# Patient Record
Sex: FEMALE | Race: WHITE | NOT HISPANIC OR LATINO | Employment: OTHER | URBAN - NONMETROPOLITAN AREA
[De-identification: names, ages, dates, MRNs, and addresses within clinical notes are randomized per-mention and may not be internally consistent; named-entity substitution may affect disease eponyms.]

---

## 2017-04-14 ENCOUNTER — OFFICE VISIT (OUTPATIENT)
Dept: CARDIOLOGY | Facility: CLINIC | Age: 62
End: 2017-04-14

## 2017-04-14 VITALS
HEART RATE: 80 BPM | DIASTOLIC BLOOD PRESSURE: 62 MMHG | SYSTOLIC BLOOD PRESSURE: 114 MMHG | HEIGHT: 67 IN | BODY MASS INDEX: 28.41 KG/M2 | WEIGHT: 181 LBS

## 2017-04-14 DIAGNOSIS — R00.2 PALPITATIONS: ICD-10-CM

## 2017-04-14 DIAGNOSIS — I34.1 MITRAL VALVE PROLAPSE: ICD-10-CM

## 2017-04-14 DIAGNOSIS — R06.02 SOB (SHORTNESS OF BREATH): Primary | ICD-10-CM

## 2017-04-14 PROBLEM — I34.0 NON-RHEUMATIC MITRAL REGURGITATION: Status: ACTIVE | Noted: 2017-04-14

## 2017-04-14 PROBLEM — I34.0 NON-RHEUMATIC MITRAL REGURGITATION: Status: RESOLVED | Noted: 2017-04-14 | Resolved: 2017-04-14

## 2017-04-14 PROCEDURE — 99213 OFFICE O/P EST LOW 20 MIN: CPT | Performed by: INTERNAL MEDICINE

## 2017-04-14 RX ORDER — METOPROLOL SUCCINATE 25 MG/1
25 TABLET, EXTENDED RELEASE ORAL DAILY
Qty: 90 TABLET | Refills: 5 | Status: SHIPPED | OUTPATIENT
Start: 2017-04-14 | End: 2017-11-08 | Stop reason: SDUPTHER

## 2017-04-14 NOTE — PROGRESS NOTES
[facility]  OFFICE NOTE    Lorin Fernandez  62 y.o. female    04/14/2017  1. SOB (shortness of breath)    2. Palpitations    3. Mitral valve prolapse        Chief complaint -palpitations      History of present Illness- 62-year-old lady with history of mitral prolapse and palpitations well controlled with Toprol-XL 25 mg daily, she had vitiligo for a long time.  She denies any chest pain other than occasional palpitations she gets once or twice a week.  She takes Zyrtec for allergies.  Only for the seasonal allergy.  She has been feeling weak on the right side I asked her to see her family doctor or the neurologist.            No Known Allergies      Past Medical History:   Diagnosis Date   • Malignant neoplasm of breast    • Palpitations    • SOB (shortness of breath)          Past Surgical History:   Procedure Laterality Date   • CHOLECYSTECTOMY     • MASTECTOMY     • TONSILLECTOMY AND ADENOIDECTOMY     • TUBAL ABDOMINAL LIGATION           No family history on file.      Social History     Social History   • Marital status:      Spouse name: N/A   • Number of children: N/A   • Years of education: N/A     Occupational History   • Not on file.     Social History Main Topics   • Smoking status: Never Smoker   • Smokeless tobacco: Never Used   • Alcohol use Yes      Comment: social   • Drug use: No   • Sexual activity: Defer     Other Topics Concern   • Not on file     Social History Narrative         Current Outpatient Prescriptions   Medication Sig Dispense Refill   • cetirizine (zyrTEC) 10 MG tablet Take 10 mg by mouth Daily.     • diphenhydrAMINE (BENADRYL) 25 mg capsule Take 25 mg by mouth Every 6 (Six) Hours As Needed for Itching.     • metoprolol succinate XL (TOPROL-XL) 25 MG 24 hr tablet Take 1 tablet by mouth Daily. 90 tablet 5     No current facility-administered medications for this visit.          Review of Systems     Constitution: Denies any fatigue, fever or  "chills    HENT: Denies any headache, hearing impairment,     Eyes: Denies any blurring of vision, or photophobia     Cardivascular - As per history of present illness     Respiratory system-denies any COPD, shortness of breath,   sleep apnea.     Endocrine:  No history of hyperlipidemia, diabetes,                      Hypothyrodism       Musculoskeletal:  No history of arthritis with musculoskeletal problems    Gastrointestinal: No nausea, vomiting, or melena    Genitourinary: No dysuria or hematuria    Neurological:   No history of seizure disorder, stroke, memory problems    Psychiatric/Behavioral:        No history of depression,  No history of bipolar disorder or schizophrenia     Hematological- no history of easy bruising or any bleeding diathesis            OBJECTIVE    /62  Pulse 80  Ht 67\" (170.2 cm)  Wt 181 lb (82.1 kg)  BMI 28.35 kg/m2      Physical Exam     Constitutional: is oriented to person, place, and time.     Skin-warm and dry, has vitiligo on the hands, history of breast cancer    Well developed and nourished in no acute distress      Head: Normocephalic and atraumatic.     Eyes: Pupils are equal, round, and reactive to light.     Neck: Neck supple. No bruit in the carotids, no elevation of JVD    Cardiovascular: [place] in the fifth intercostal space   Regular rate, and  Rhythm, 2/6 holosystolic murmur with a click    S1 greater than S2, no S3 or S4, no gallop     Pulmonary/Chest:   Air  Entry is equal on both sides  No wheezing or crackles,      Abdominal: Soft.  No hepatosplenomegaly, bowel sounds are present    Musculoskeletal: No kyphoscoliosis, no significant thickening of the joints    Neurological: is alert and oriented to person, place, and time.    cranial nerve are intact .   No motor or sensory deficit    Extremities-no edema, no radial femoral delay      Psychiatric: He has a normal mood and affect.                  His behavior is normal.           Procedures      Lab Results "   Component Value Date    WBC 5.4 11/01/2016    HGB 13.1 11/01/2016    HCT 39.0 11/01/2016    MCV 84.2 11/01/2016     11/01/2016     Lab Results   Component Value Date    GLUCOSE 96 11/01/2016    BUN 11 11/01/2016    CREATININE 0.8 11/01/2016    CO2 30 11/01/2016    CALCIUM 8.8 11/01/2016    ALBUMIN 4.5 11/01/2016    AST 28 11/01/2016    ALT 25 11/01/2016     No results found for: CHOL  Lab Results   Component Value Date    TRIG 69 01/15/2016     No results found for: HDL  Lab Results   Component Value Date    LDLCALC 128 01/15/2016     No results found for: LDL  No results found for: HDLLDLRATIO  No components found for: CHOLHDL  No results found for: HGBA1C  Lab Results   Component Value Date    TSH 2.12 07/21/2016                  A/P    Mitral valve prolapse with mild MR with palpitations on Toprol-XL 25 mg daily and palpitations are well controlled.    Vitligo - stable    Seasonal allergies on Zyrtec 10 mg daily.  Follow-up in 1 year            This document has been electronically signed by Gibran Pal MD on April 14, 2017 9:34 AM       EMR Dragon/Transcription disclaimer:   Some of this note may be an electronic transcription/translation of spoken language to printed text. The electronic translation of spoken language may permit erroneous, or at times, nonsensical words or phrases to be inadvertently transcribed; Although I have reviewed the note for such errors, some may still exist.

## 2017-04-17 DIAGNOSIS — R53.1 WEAKNESS: Primary | ICD-10-CM

## 2017-05-09 ENCOUNTER — OFFICE VISIT (OUTPATIENT)
Dept: INTERNAL MEDICINE | Facility: CLINIC | Age: 62
End: 2017-05-09

## 2017-05-09 ENCOUNTER — APPOINTMENT (OUTPATIENT)
Dept: LAB | Facility: HOSPITAL | Age: 62
End: 2017-05-09

## 2017-05-09 VITALS
HEIGHT: 67 IN | BODY MASS INDEX: 28.49 KG/M2 | WEIGHT: 181.5 LBS | SYSTOLIC BLOOD PRESSURE: 116 MMHG | DIASTOLIC BLOOD PRESSURE: 72 MMHG

## 2017-05-09 DIAGNOSIS — R51.9 NONINTRACTABLE HEADACHE, UNSPECIFIED CHRONICITY PATTERN, UNSPECIFIED HEADACHE TYPE: Primary | ICD-10-CM

## 2017-05-09 LAB
BASOPHILS # BLD AUTO: 0.04 10*3/MM3 (ref 0–0.2)
BASOPHILS NFR BLD AUTO: 0.5 % (ref 0–2)
CRP SERPL-MCNC: 0.9 MG/DL (ref 0–1)
DEPRECATED RDW RBC AUTO: 40.5 FL (ref 36.4–46.3)
EOSINOPHIL # BLD AUTO: 0.05 10*3/MM3 (ref 0–0.7)
EOSINOPHIL NFR BLD AUTO: 0.6 % (ref 0–7)
ERYTHROCYTE [DISTWIDTH] IN BLOOD BY AUTOMATED COUNT: 13.2 % (ref 11.5–14.5)
ERYTHROCYTE [SEDIMENTATION RATE] IN BLOOD: 13 MM/HR (ref 0–20)
HCT VFR BLD AUTO: 38.5 % (ref 35–45)
HGB BLD-MCNC: 12.8 G/DL (ref 12–15.5)
IMM GRANULOCYTES # BLD: 0.02 10*3/MM3 (ref 0–0.02)
IMM GRANULOCYTES NFR BLD: 0.2 % (ref 0–0.5)
LYMPHOCYTES # BLD AUTO: 1.89 10*3/MM3 (ref 0.6–4.2)
LYMPHOCYTES NFR BLD AUTO: 21.9 % (ref 10–50)
MCH RBC QN AUTO: 28.1 PG (ref 26.5–34)
MCHC RBC AUTO-ENTMCNC: 33.2 G/DL (ref 31.4–36)
MCV RBC AUTO: 84.6 FL (ref 80–98)
MONOCYTES # BLD AUTO: 0.59 10*3/MM3 (ref 0–0.9)
MONOCYTES NFR BLD AUTO: 6.8 % (ref 0–12)
NEUTROPHILS # BLD AUTO: 6.04 10*3/MM3 (ref 2–8.6)
NEUTROPHILS NFR BLD AUTO: 70 % (ref 37–80)
PLATELET # BLD AUTO: 294 10*3/MM3 (ref 150–450)
PMV BLD AUTO: 9.5 FL (ref 8–12)
RBC # BLD AUTO: 4.55 10*6/MM3 (ref 3.77–5.16)
WBC NRBC COR # BLD: 8.63 10*3/MM3 (ref 3.2–9.8)

## 2017-05-09 PROCEDURE — 85025 COMPLETE CBC W/AUTO DIFF WBC: CPT | Performed by: INTERNAL MEDICINE

## 2017-05-09 PROCEDURE — 99213 OFFICE O/P EST LOW 20 MIN: CPT | Performed by: INTERNAL MEDICINE

## 2017-05-09 PROCEDURE — 86140 C-REACTIVE PROTEIN: CPT | Performed by: INTERNAL MEDICINE

## 2017-05-09 PROCEDURE — 36415 COLL VENOUS BLD VENIPUNCTURE: CPT | Performed by: INTERNAL MEDICINE

## 2017-05-09 PROCEDURE — 85651 RBC SED RATE NONAUTOMATED: CPT | Performed by: INTERNAL MEDICINE

## 2017-11-08 RX ORDER — METOPROLOL SUCCINATE 25 MG/1
25 TABLET, EXTENDED RELEASE ORAL DAILY
Qty: 90 TABLET | Refills: 5 | Status: SHIPPED | OUTPATIENT
Start: 2017-11-08 | End: 2019-01-14 | Stop reason: SDUPTHER

## 2018-03-23 ENCOUNTER — OFFICE VISIT (OUTPATIENT)
Dept: INTERNAL MEDICINE | Facility: CLINIC | Age: 63
End: 2018-03-23

## 2018-03-23 VITALS
HEIGHT: 67 IN | SYSTOLIC BLOOD PRESSURE: 100 MMHG | WEIGHT: 167.7 LBS | BODY MASS INDEX: 26.32 KG/M2 | DIASTOLIC BLOOD PRESSURE: 70 MMHG

## 2018-03-23 DIAGNOSIS — G20 PARKINSON'S DISEASE (HCC): ICD-10-CM

## 2018-03-23 DIAGNOSIS — G43.909 MIGRAINE WITHOUT STATUS MIGRAINOSUS, NOT INTRACTABLE, UNSPECIFIED MIGRAINE TYPE: Primary | ICD-10-CM

## 2018-03-23 PROCEDURE — 99213 OFFICE O/P EST LOW 20 MIN: CPT | Performed by: INTERNAL MEDICINE

## 2018-03-23 RX ORDER — SODIUM PHOSPHATE,MONO-DIBASIC 19G-7G/118
ENEMA (ML) RECTAL
COMMUNITY
End: 2019-10-03

## 2018-03-23 RX ORDER — SUMATRIPTAN 50 MG/1
TABLET, FILM COATED ORAL
Qty: 30 TABLET | Refills: 1 | Status: SHIPPED | OUTPATIENT
Start: 2018-03-23 | End: 2019-10-03

## 2018-03-23 NOTE — PROGRESS NOTES
Subjective   Lorin Fernandez is a 62 y.o. female with PMH of Parkinson's disease.    Patient has been on Sinemet for a year- doing well. Tremors are better and no stiffness. Mild gait problems.  She was seen by  but would like to see different neurologist due to access issues.    Having migraine headaches for the last several weeks. Throbbing unilateral headaches, preceded by visual aura, flashes and accompanied by photophobia. Was seen ophthalmology - was told that no eye problems.  Takes Ibuprofen and had to take it daily.    Migraine    The current episode started more than 1 month ago. The pain is located in the left unilateral region. The pain does not radiate. The quality of the pain is described as throbbing. The pain is at a severity of 4/10. The pain is moderate. Associated symptoms include photophobia and a visual change. Pertinent negatives include no coughing, dizziness, facial sweating, fever, neck pain, numbness, rhinorrhea, scalp tenderness, seizures, vomiting or weakness. The symptoms are aggravated by unknown. She has tried NSAIDs for the symptoms. The treatment provided mild relief.       Past Medical History:   Diagnosis Date   • Malignant neoplasm of breast    • Palpitations    • Parkinson's disease    • SOB (shortness of breath)      Past Surgical History:   Procedure Laterality Date   • CHOLECYSTECTOMY     • COLONOSCOPY      2011 as per patient - normal exam   • MASTECTOMY     • TONSILLECTOMY AND ADENOIDECTOMY     • TUBAL ABDOMINAL LIGATION         Social History   Substance Use Topics   • Smoking status: Never Smoker   • Smokeless tobacco: Never Used   • Alcohol use Yes      Comment: social     History reviewed. No pertinent family history.  No Known Allergies  Current Outpatient Prescriptions on File Prior to Visit   Medication Sig Dispense Refill   • cetirizine (zyrTEC) 10 MG tablet Take 10 mg by mouth Daily.     • diphenhydrAMINE (BENADRYL) 25 mg capsule Take 25 mg by mouth Every  6 (Six) Hours As Needed for Itching.     • metoprolol succinate XL (TOPROL-XL) 25 MG 24 hr tablet Take 1 tablet by mouth Daily. 90 tablet 5     No current facility-administered medications on file prior to visit.      Review of Systems   Constitutional: Negative for fever.   HENT: Negative for congestion, facial swelling, rhinorrhea and sinus pain.    Eyes: Positive for photophobia.   Respiratory: Negative for cough and shortness of breath.    Cardiovascular: Negative for leg swelling.   Gastrointestinal: Negative for vomiting.   Musculoskeletal: Positive for gait problem. Negative for neck pain.   Neurological: Positive for tremors and headaches. Negative for dizziness, seizures, syncope, speech difficulty, weakness, light-headedness and numbness.   Hematological: Negative for adenopathy. Does not bruise/bleed easily.       Objective   Physical Exam   Constitutional: She is oriented to person, place, and time. She appears well-developed and well-nourished.   HENT:   Head: Normocephalic and atraumatic.   Eyes: Conjunctivae are normal. Pupils are equal, round, and reactive to light. No scleral icterus.   Neck: Neck supple. No JVD present. No thyromegaly present.   Cardiovascular: Normal rate and regular rhythm.    Pulmonary/Chest: Effort normal and breath sounds normal.   Abdominal: Soft. Bowel sounds are normal.   Musculoskeletal: She exhibits no edema or deformity.   Neurological: She is alert and oriented to person, place, and time. She has normal reflexes. She displays normal reflexes. No cranial nerve deficit. She exhibits normal muscle tone.   Mild rest tremor   Skin: Skin is warm and dry.           Patient Active Problem List   Diagnosis   • SOB (shortness of breath)   • Palpitations   • Malignant neoplasm of breast   • Mitral valve prolapse               Assessment/Plan   Lorin was seen today for follow-up, parkinson's disease and headache.    Diagnoses and all orders for this visit:    Migraine without  status migrainosus, not intractable, unspecified migraine type    Parkinson's disease  -     Ambulatory Referral to Neurology  -     CBC (No Diff)  -     Comprehensive metabolic panel  -     TSH    Other orders  -     SUMAtriptan (IMITREX) 50 MG tablet; Take one tablet at onset of headache. May repeat dose one time in 2 hours if headache not relieved.           Plan     1. Trial of Imitrex 50 mg PRN headache and may advance dose if needed to 100 mg.      Medication side effects reviewed.      Advised about judicious use of OTC NSAIDS and analgesics as daily use can cause medication             overuse headaches.       Watch for headaches triggers.      May need prophylaxis if continues to have frequent migraines.  2. Doing well on Sinemet but would like to see neurologist and this will be arranged.  3. Not interested in vaccinations.        Follow up in a month.        This document has been electronically signed by Lise Lopez MD on March 23, 2018 10:53 PM

## 2019-01-15 RX ORDER — METOPROLOL SUCCINATE 25 MG/1
TABLET, EXTENDED RELEASE ORAL
Qty: 90 TABLET | Refills: 3 | Status: SHIPPED | OUTPATIENT
Start: 2019-01-15 | End: 2019-11-11

## 2019-10-03 ENCOUNTER — CONSULT (OUTPATIENT)
Dept: ONCOLOGY | Facility: CLINIC | Age: 64
End: 2019-10-03

## 2019-10-03 ENCOUNTER — APPOINTMENT (OUTPATIENT)
Dept: ONCOLOGY | Facility: HOSPITAL | Age: 64
End: 2019-10-03

## 2019-10-03 VITALS
SYSTOLIC BLOOD PRESSURE: 125 MMHG | DIASTOLIC BLOOD PRESSURE: 63 MMHG | BODY MASS INDEX: 23.17 KG/M2 | TEMPERATURE: 98.5 F | HEIGHT: 67 IN | HEART RATE: 74 BPM | WEIGHT: 147.6 LBS

## 2019-10-03 DIAGNOSIS — C50.811 MALIGNANT NEOPLASM OF OVERLAPPING SITES OF BOTH BREASTS IN FEMALE, ESTROGEN RECEPTOR POSITIVE (HCC): ICD-10-CM

## 2019-10-03 DIAGNOSIS — C50.812 MALIGNANT NEOPLASM OF OVERLAPPING SITES OF BOTH BREASTS IN FEMALE, ESTROGEN RECEPTOR POSITIVE (HCC): Primary | ICD-10-CM

## 2019-10-03 DIAGNOSIS — Z17.0 MALIGNANT NEOPLASM OF OVERLAPPING SITES OF BOTH BREASTS IN FEMALE, ESTROGEN RECEPTOR POSITIVE (HCC): Primary | ICD-10-CM

## 2019-10-03 DIAGNOSIS — C50.812 MALIGNANT NEOPLASM OF OVERLAPPING SITES OF BOTH BREASTS IN FEMALE, ESTROGEN RECEPTOR POSITIVE (HCC): ICD-10-CM

## 2019-10-03 DIAGNOSIS — C50.811 MALIGNANT NEOPLASM OF OVERLAPPING SITES OF BOTH BREASTS IN FEMALE, ESTROGEN RECEPTOR POSITIVE (HCC): Primary | ICD-10-CM

## 2019-10-03 DIAGNOSIS — R63.4 ABNORMAL WEIGHT LOSS: ICD-10-CM

## 2019-10-03 DIAGNOSIS — K59.09 OTHER CONSTIPATION: ICD-10-CM

## 2019-10-03 DIAGNOSIS — Z17.0 MALIGNANT NEOPLASM OF OVERLAPPING SITES OF BOTH BREASTS IN FEMALE, ESTROGEN RECEPTOR POSITIVE (HCC): ICD-10-CM

## 2019-10-03 LAB
ALBUMIN SERPL-MCNC: 4.7 G/DL (ref 3.5–5.2)
ALBUMIN/GLOB SERPL: 1.7 G/DL
ALP SERPL-CCNC: 65 U/L (ref 39–117)
ALT SERPL W P-5'-P-CCNC: <5 U/L (ref 1–33)
ANION GAP SERPL CALCULATED.3IONS-SCNC: 10 MMOL/L (ref 5–15)
AST SERPL-CCNC: 9 U/L (ref 1–32)
BASOPHILS # BLD AUTO: 0.07 10*3/MM3 (ref 0–0.2)
BASOPHILS NFR BLD AUTO: 1.1 % (ref 0–1.5)
BILIRUB SERPL-MCNC: 0.2 MG/DL (ref 0.2–1.2)
BUN BLD-MCNC: 12 MG/DL (ref 8–23)
BUN/CREAT SERPL: 15.4 (ref 7–25)
CALCIUM SPEC-SCNC: 9.3 MG/DL (ref 8.6–10.5)
CHLORIDE SERPL-SCNC: 101 MMOL/L (ref 98–107)
CO2 SERPL-SCNC: 28 MMOL/L (ref 22–29)
CREAT BLD-MCNC: 0.78 MG/DL (ref 0.57–1)
DEPRECATED RDW RBC AUTO: 41.3 FL (ref 37–54)
EOSINOPHIL # BLD AUTO: 0.06 10*3/MM3 (ref 0–0.4)
EOSINOPHIL NFR BLD AUTO: 0.9 % (ref 0.3–6.2)
ERYTHROCYTE [DISTWIDTH] IN BLOOD BY AUTOMATED COUNT: 13.1 % (ref 12.3–15.4)
GFR SERPL CREATININE-BSD FRML MDRD: 74 ML/MIN/1.73
GLOBULIN UR ELPH-MCNC: 2.7 GM/DL
GLUCOSE BLD-MCNC: 103 MG/DL (ref 65–99)
HCT VFR BLD AUTO: 38.1 % (ref 34–46.6)
HGB BLD-MCNC: 12.9 G/DL (ref 12–15.9)
IMM GRANULOCYTES # BLD AUTO: 0.02 10*3/MM3 (ref 0–0.05)
IMM GRANULOCYTES NFR BLD AUTO: 0.3 % (ref 0–0.5)
LYMPHOCYTES # BLD AUTO: 1.88 10*3/MM3 (ref 0.7–3.1)
LYMPHOCYTES NFR BLD AUTO: 28.5 % (ref 19.6–45.3)
MCH RBC QN AUTO: 29.6 PG (ref 26.6–33)
MCHC RBC AUTO-ENTMCNC: 33.9 G/DL (ref 31.5–35.7)
MCV RBC AUTO: 87.4 FL (ref 79–97)
MONOCYTES # BLD AUTO: 0.65 10*3/MM3 (ref 0.1–0.9)
MONOCYTES NFR BLD AUTO: 9.9 % (ref 5–12)
NEUTROPHILS # BLD AUTO: 3.91 10*3/MM3 (ref 1.7–7)
NEUTROPHILS NFR BLD AUTO: 59.3 % (ref 42.7–76)
NRBC BLD AUTO-RTO: 0 /100 WBC (ref 0–0.2)
PLATELET # BLD AUTO: 294 10*3/MM3 (ref 140–450)
PMV BLD AUTO: 9.1 FL (ref 6–12)
POTASSIUM BLD-SCNC: 4.1 MMOL/L (ref 3.5–5.2)
PROT SERPL-MCNC: 7.4 G/DL (ref 6–8.5)
RBC # BLD AUTO: 4.36 10*6/MM3 (ref 3.77–5.28)
SODIUM BLD-SCNC: 139 MMOL/L (ref 136–145)
WBC NRBC COR # BLD: 6.59 10*3/MM3 (ref 3.4–10.8)

## 2019-10-03 PROCEDURE — G9903 PT SCRN TBCO ID AS NON USER: HCPCS | Performed by: INTERNAL MEDICINE

## 2019-10-03 PROCEDURE — 99214 OFFICE O/P EST MOD 30 MIN: CPT | Performed by: INTERNAL MEDICINE

## 2019-10-03 PROCEDURE — 1123F ACP DISCUSS/DSCN MKR DOCD: CPT | Performed by: INTERNAL MEDICINE

## 2019-10-03 PROCEDURE — 80053 COMPREHEN METABOLIC PANEL: CPT | Performed by: INTERNAL MEDICINE

## 2019-10-03 PROCEDURE — G0463 HOSPITAL OUTPT CLINIC VISIT: HCPCS | Performed by: INTERNAL MEDICINE

## 2019-10-03 PROCEDURE — G8731 PAIN NEG NO PLAN: HCPCS | Performed by: INTERNAL MEDICINE

## 2019-10-03 PROCEDURE — 85025 COMPLETE CBC W/AUTO DIFF WBC: CPT | Performed by: INTERNAL MEDICINE

## 2019-10-03 RX ORDER — ENTACAPONE 200 MG/1
200 TABLET ORAL 3 TIMES DAILY
COMMUNITY

## 2019-10-03 NOTE — PROGRESS NOTES
DATE OF VISIT: 10/3/2019      REASON FOR VISIT: Synchronus bilateral breast cancer, abnormal weight loss, new onset of constipation      HISTORY OF PRESENT ILLNESS:    64-year-old female with medical problem consisting of synchronous bilateral breast cancer diagnosed in 2010 status post chemotherapy and could not tolerate hormonal therapy so she has been on surveillance since 2012,  mitral valve prolapse,  Parkinson's disease on Sinemet was last seen here at [facility] in November 2016.  That after that patient was lost to follow-up.  Patient returns to [facility] today with complaint of abnormal weight loss of 30 pounds since last clinic visit.  States she has lost 8 pounds in last 2 weeks.  Denies any nausea or vomiting.  Complains of new onset of constipation since starting Sinemet.  Denies any blood in the stool.  Denies any new lymph node enlargement.  Denies any new skin lesion.  Denies any new tingling or numbness affecting upper or lower extremity.      PAST MEDICAL HISTORY:    Past Medical History:   Diagnosis Date   • Malignant neoplasm of breast (CMS/HCC)    • Palpitations    • Parkinson's disease (CMS/HCC)    • SOB (shortness of breath)        SOCIAL HISTORY:    Social History     Tobacco Use   • Smoking status: Never Smoker   • Smokeless tobacco: Never Used   Substance Use Topics   • Alcohol use: Yes     Comment: social   • Drug use: No       Surgical History :  Past Surgical History:   Procedure Laterality Date   • ANAL FISSURECTOMY     • CHOLECYSTECTOMY     • COLONOSCOPY      2011 as per patient - normal exam   • MASTECTOMY     • TONSILLECTOMY AND ADENOIDECTOMY     • TUBAL ABDOMINAL LIGATION         ALLERGIES:    No Known Allergies      FAMILY HISTORY:  Family History   Problem Relation Age of Onset   • Breast cancer Mother    • Lung cancer Father    • Breast cancer Maternal Aunt    • Breast cancer Maternal Grandmother    • Breast cancer Paternal Grandmother            REVIEW OF  "SYSTEMS:      CONSTITUTIONAL:  Complains of fatigue.  Complains of 30 pound of weight loss in last 3-year.  States she has lost 8 pounds in last 2 weeks.  Denies any fever, chills .     EYES: No visual disturbances. No discharge. No new lesions    ENMT:  No epistaxis, mouth sores or difficulty swallowing.    RESPIRATORY:  No new shortness of breath. No new cough or hemoptysis.    CARDIOVASCULAR: Complains of intermittent palpitation.  No chest pain .    GASTROINTESTINAL: Complains of new onset of constipation since starting Sinemet.Complains of intermittent nausea.  No abdominal pain nausea, vomiting.    GENITOURINARY: No Dysuria or Hematuria.    MUSCULOSKELETAL:  No new back pain or arthralgia.    LYMPHATICS:  Denies any abnormal swollen glands anywhere in the body.    NEUROLOGICAL : No tingling or numbness. No headache or dizziness. No seizures or balance problems.    SKIN: No new skin lesions.    ENDOCRINE : No new heat or cold intolerance. No new polyuria . No polydipsia.        PHYSICAL EXAMINATION:      VITAL SIGNS:  /63   Pulse 74   Temp 98.5 °F (36.9 °C)   Ht 170.2 cm (67\")   Wt 67 kg (147 lb 9.6 oz)   BMI 23.12 kg/m²       10/03/19  1354   Weight: 67 kg (147 lb 9.6 oz)         ECOG performance status: 2    CONSTITUTIONAL:  Not in any distress.    EYES: Mild conjunctival Pallor. No Icterus. No Pterygium. Extraocular Movements intact.No ptosis.    ENMT:  Normocephalic, Atraumatic.No Facial Asymmetry noted.    NECK:  No adenopathy.Trachea midline. NO JVD.    RESPIRATORY:  Fair air entry bilateral. No rhonchi or wheezing.Fair respiratory effort.    CARDIOVASCULAR:  S1, S2. Regular rate and rhythm. No murmur or gallop appreciated.    ABDOMEN:  Soft, nontender. Bowel sounds present in all four quadrants.  No Hepatosplenomegaly appreciated.    MUSCULOSKELETAL:  No edema.No Calf Tenderness.Decreased range of motion.    NEUROLOGIC:    No  Motor or sensory deficit appreciated. Cranial Nerves 2-12 grossly " intact.    SKIN : No new skin lesion identified. Skin is warm and dry to touch.    LYMPHATICS: No new enlarged lymph nodes in neck or supraclavicular area.    PSYCHIATRY: Alert, awake and oriented ×3.Normal affect.   Makes good eye contact.        DIAGNOSTIC DATA:    Glucose   Date Value Ref Range Status   10/03/2019 103 (H) 65 - 99 mg/dL Final     Sodium   Date Value Ref Range Status   10/03/2019 139 136 - 145 mmol/L Final     Potassium   Date Value Ref Range Status   10/03/2019 4.1 3.5 - 5.2 mmol/L Final     CO2   Date Value Ref Range Status   10/03/2019 28.0 22.0 - 29.0 mmol/L Final     Chloride   Date Value Ref Range Status   10/03/2019 101 98 - 107 mmol/L Final     Anion Gap   Date Value Ref Range Status   10/03/2019 10.0 5.0 - 15.0 mmol/L Final     Creatinine   Date Value Ref Range Status   10/03/2019 0.78 0.57 - 1.00 mg/dL Final     BUN   Date Value Ref Range Status   10/03/2019 12 8 - 23 mg/dL Final     BUN/Creatinine Ratio   Date Value Ref Range Status   10/03/2019 15.4 7.0 - 25.0 Final     Calcium   Date Value Ref Range Status   10/03/2019 9.3 8.6 - 10.5 mg/dL Final     eGFR Non  Amer   Date Value Ref Range Status   10/03/2019 74 >60 mL/min/1.73 Final     Alkaline Phosphatase   Date Value Ref Range Status   10/03/2019 65 39 - 117 U/L Final     Total Protein   Date Value Ref Range Status   10/03/2019 7.4 6.0 - 8.5 g/dL Final     ALT (SGPT)   Date Value Ref Range Status   10/03/2019 <5 1 - 33 U/L Final     AST (SGOT)   Date Value Ref Range Status   10/03/2019 9 1 - 32 U/L Final     Total Bilirubin   Date Value Ref Range Status   10/03/2019 0.2 0.2 - 1.2 mg/dL Final     Albumin   Date Value Ref Range Status   10/03/2019 4.70 3.50 - 5.20 g/dL Final     Globulin   Date Value Ref Range Status   10/03/2019 2.7 gm/dL Final     Lab Results   Component Value Date    WBC 6.59 10/03/2019    HGB 12.9 10/03/2019    HCT 38.1 10/03/2019    MCV 87.4 10/03/2019     10/03/2019     Lab Results   Component Value  Date    NEUTROABS 3.91 10/03/2019     No results found for: , LABCA2, AFPTM, HCGQUANT, , CHROMGRNA, [id number], CEA, REFLABREPO        PATHOLOGY:  Pathology  report from September 24, 2010 showed:                            ASSESSMENT AND PLAN:      1.  Synchronous bilateral breast cancer:  - Patient  was diagnosed with bilateral synchronous breast cancer in August 2010.  -Patient had a bilateral mastectomy done a friend.  She had a stage I cancer on left side.  She had stage IIa cancer on right side with 2 lymph node having microscopic metastasis.  She was positive for estrogen and progesterone receptor negative for HER-2/ranjith.  -Patient initially received chemotherapy and dose dense fashion with Adriamycin and Cytoxan followed by Taxol that completed in February 2011.  - Patient was tried on 3 different hormone kind of medication but she could not tolerate it.  She has been off any kind of hormonal treatment since 2012.  - Since patient has a history of hormone receptor positive cancer and she could not tolerate any hormonal treatment, weight loss of 30 pound is worrisome for recurrence.  -We will get CT of chest, abdomen and pelvis with contrast to rule out any recurrence of her breast cancer.  This recommendation were discussed with patient.  -We will ask patient to return to clinic in 2 weeks to go over the results of CT scan and further recommendation.    2.  Weight loss: Questionable etiology:  - Her history of bilateral breast cancer and not being able to complete treatment with hormonal treatment is worrisome factor for recurrence.  - We will get CT of chest, abdomen and pelvis with contrast to rule out recurrence.    3.  New onset of constipation:  -Patient complains of new onset of constipation since starting taking Sinemet.  -We will check stool for occult blood, if stool for occult blood comes back positive will refer her to gastroenterology team for further evaluation.    4.  Parkinson's  disease on Sinemet    5.  Health maintenance: Patient quit smoking in 1981.  She had a bilateral mastectomy does not need mammogram.  She had hysterectomy in does not get Pap smear.    6.  Pain assessment:  -Patient denies any pain today.    7.  Advanced care planning: Patient is able to make her decision and remains full code for now.     Jatinder Olson MD  10/3/2019  3:35 PM        EMR Dragon/Transcription disclaimer:   Much of this encounter note is an electronic transcription/translation of spoken language to printed text. The electronic translation of spoken language may permit erroneous, or at times, nonsensical words or phrases to be inadvertently transcribed; Although I have reviewed the note for such errors, some may still exist.

## 2019-10-03 NOTE — PATIENT INSTRUCTIONS
Patient Instructions for CT Scan    · Your CT scan is being done without any oral contrast.  Your CT scan may be done with IV contrast, if you have an allergy to iodine--please tell your nurse.    · Do not eat or drink 4 hours prior to scan.     · You may take your medications with sips of water, except DO NOT take your diabetic pill the morning of the test.    Arrive at the Outpatient Surgery entrance at [facility] 20 minutes prior to appointment time.    You will receive a phone call with an appointment for your CT scan.  Please call our office, if someone does not contact you with 3 days.    Karolina Day RN  October 3, 2019  2:28 PM

## 2019-10-03 NOTE — PROGRESS NOTES
New patient consultation.  Medical oncology.  Distress score #2.  Oncology SW met briefly with pt prior to her departure.  SW introduced self and explained role and support, contact info provided.  Pt with history of breast cancer, post bilateral mastectomy.  Pt. Known to cancer center from prior treatment.  Pt. States no needs on this visit and none identified.  Pt. Presents receptive to SW feedback and voiced understanding of oncology supports and services.

## 2019-10-04 ENCOUNTER — APPOINTMENT (OUTPATIENT)
Dept: LAB | Facility: HOSPITAL | Age: 64
End: 2019-10-04

## 2019-10-04 ENCOUNTER — APPOINTMENT (OUTPATIENT)
Dept: ONCOLOGY | Facility: HOSPITAL | Age: 64
End: 2019-10-04

## 2019-10-04 LAB — HEMOCCULT STL QL IA: POSITIVE

## 2019-10-04 PROCEDURE — 82274 ASSAY TEST FOR BLOOD FECAL: CPT | Performed by: INTERNAL MEDICINE

## 2019-10-10 ENCOUNTER — HOSPITAL ENCOUNTER (OUTPATIENT)
Dept: CT IMAGING | Facility: HOSPITAL | Age: 64
Discharge: HOME | End: 2019-10-10
Admitting: INTERNAL MEDICINE

## 2019-10-10 DIAGNOSIS — C50.811 MALIGNANT NEOPLASM OF OVERLAPPING SITES OF BOTH BREASTS IN FEMALE, ESTROGEN RECEPTOR POSITIVE (HCC): ICD-10-CM

## 2019-10-10 DIAGNOSIS — Z17.0 MALIGNANT NEOPLASM OF OVERLAPPING SITES OF BOTH BREASTS IN FEMALE, ESTROGEN RECEPTOR POSITIVE (HCC): ICD-10-CM

## 2019-10-10 DIAGNOSIS — C50.812 MALIGNANT NEOPLASM OF OVERLAPPING SITES OF BOTH BREASTS IN FEMALE, ESTROGEN RECEPTOR POSITIVE (HCC): ICD-10-CM

## 2019-10-10 DIAGNOSIS — R63.4 ABNORMAL WEIGHT LOSS: ICD-10-CM

## 2019-10-10 PROCEDURE — 74177 CT ABD & PELVIS W/CONTRAST: CPT

## 2019-10-10 PROCEDURE — 25010000002 IOPAMIDOL 61 % SOLUTION: Performed by: INTERNAL MEDICINE

## 2019-10-10 PROCEDURE — 71260 CT THORAX DX C+: CPT

## 2019-10-10 RX ADMIN — IOPAMIDOL 85 ML: 612 INJECTION, SOLUTION INTRAVENOUS at 14:52

## 2019-10-11 ENCOUNTER — TELEPHONE (OUTPATIENT)
Dept: NUTRITION | Facility: HOSPITAL | Age: 64
End: 2019-10-11

## 2019-10-11 NOTE — PROGRESS NOTES
Adult Outpatient Nutrition  Assessment    Patient Name:  Lorin Fernandez  YOB: 1955  MRN: [id number]    Assessment Date:  10/11/2019    Comments: Chart review revealed 64WF coming to [facility] due to bilateral breast cancer. Diagnosed in 2010 (received chemo). Alb 4.7.  Ht 67 in. Wt 147.8 lb--trending down for past 3 years (lost 18% body weight). Accelerated weigh loss recently. Constipation. Attempted phone contact without success. Left message offering nutrition services. Urged calorie/protein rich small frequent feedings. Supplement suggestions provided. Written information with RDN's name and number mailed to home.                        Electronically signed by:  Kayla Haider RD  10/11/19 11:08 AM

## 2019-10-18 ENCOUNTER — OFFICE VISIT (OUTPATIENT)
Dept: ONCOLOGY | Facility: CLINIC | Age: 64
End: 2019-10-18

## 2019-10-18 VITALS
HEIGHT: 67 IN | HEART RATE: 71 BPM | WEIGHT: 146.8 LBS | SYSTOLIC BLOOD PRESSURE: 111 MMHG | BODY MASS INDEX: 23.04 KG/M2 | RESPIRATION RATE: 18 BRPM | TEMPERATURE: 98.6 F | DIASTOLIC BLOOD PRESSURE: 55 MMHG

## 2019-10-18 DIAGNOSIS — Z17.0 MALIGNANT NEOPLASM OF OVERLAPPING SITES OF BOTH BREASTS IN FEMALE, ESTROGEN RECEPTOR POSITIVE (HCC): ICD-10-CM

## 2019-10-18 DIAGNOSIS — C50.812 MALIGNANT NEOPLASM OF OVERLAPPING SITES OF BOTH BREASTS IN FEMALE, ESTROGEN RECEPTOR POSITIVE (HCC): ICD-10-CM

## 2019-10-18 DIAGNOSIS — R19.5 OCCULT BLOOD POSITIVE STOOL: ICD-10-CM

## 2019-10-18 DIAGNOSIS — K59.09 OTHER CONSTIPATION: Primary | ICD-10-CM

## 2019-10-18 DIAGNOSIS — R63.4 ABNORMAL WEIGHT LOSS: ICD-10-CM

## 2019-10-18 DIAGNOSIS — C50.811 MALIGNANT NEOPLASM OF OVERLAPPING SITES OF BOTH BREASTS IN FEMALE, ESTROGEN RECEPTOR POSITIVE (HCC): ICD-10-CM

## 2019-10-18 PROCEDURE — G0463 HOSPITAL OUTPT CLINIC VISIT: HCPCS | Performed by: INTERNAL MEDICINE

## 2019-10-18 PROCEDURE — G8731 PAIN NEG NO PLAN: HCPCS | Performed by: INTERNAL MEDICINE

## 2019-10-18 PROCEDURE — 99214 OFFICE O/P EST MOD 30 MIN: CPT | Performed by: INTERNAL MEDICINE

## 2019-10-18 PROCEDURE — G9903 PT SCRN TBCO ID AS NON USER: HCPCS | Performed by: INTERNAL MEDICINE

## 2019-10-18 PROCEDURE — 1123F ACP DISCUSS/DSCN MKR DOCD: CPT | Performed by: INTERNAL MEDICINE

## 2019-10-18 NOTE — PROGRESS NOTES
DATE OF VISIT: 10/18/2019      REASON FOR VISIT: Synchronus bilateral breast cancer, abnormal weight loss, new onset of constipation      HISTORY OF PRESENT ILLNESS:    64-year-old female with medical problem consisting of synchronous bilateral breast cancer diagnosed in 2010 status post chemotherapy and could not tolerate hormonal therapy so she has been on surveillance since 2012,  mitral valve prolapse,  Parkinson's disease on Sinemet was last seen here at [facility] in November 2016.  That after that patient was lost to follow-up.  Patient was again seen back at here at [facility] on August 3, 2019 for unexplained weight loss of 30 pounds in last 2 years.  Patient had a CT of chest abdomen and pelvis with contrast done.  She is here to discuss the results and further recommendation denies any blood in the stool.  Denies any new lymph node enlargement.  Denies any new skin lesion.  Denies any new tingling or numbness affecting upper or lower extremity.      PAST MEDICAL HISTORY:    Past Medical History:   Diagnosis Date   • Malignant neoplasm of breast (CMS/HCC)    • Palpitations    • Parkinson's disease (CMS/HCC)    • SOB (shortness of breath)        SOCIAL HISTORY:    Social History     Tobacco Use   • Smoking status: Never Smoker   • Smokeless tobacco: Never Used   Substance Use Topics   • Alcohol use: Yes     Comment: social   • Drug use: No       Surgical History :  Past Surgical History:   Procedure Laterality Date   • ANAL FISSURECTOMY     • CHOLECYSTECTOMY     • COLONOSCOPY      2011 as per patient - normal exam   • MASTECTOMY     • TONSILLECTOMY AND ADENOIDECTOMY     • TUBAL ABDOMINAL LIGATION         ALLERGIES:    No Known Allergies      FAMILY HISTORY:  Family History   Problem Relation Age of Onset   • Breast cancer Mother    • Lung cancer Father    • Breast cancer Maternal Aunt    • Breast cancer Maternal Grandmother    • Breast cancer Paternal Grandmother            REVIEW OF  "SYSTEMS:      CONSTITUTIONAL:  Complains of fatigue.  Complains of 31 pound of weight loss in last 3-year.  States she has lost 8 pounds in last 2 weeks.  Denies any fever, chills .     EYES: No visual disturbances. No discharge. No new lesions    ENMT:  No epistaxis, mouth sores or difficulty swallowing.    RESPIRATORY:  No new shortness of breath. No new cough or hemoptysis.    CARDIOVASCULAR: Complains of intermittent palpitation.  No chest pain .    GASTROINTESTINAL: Complains of new onset of constipation since starting Sinemet.Complains of intermittent nausea.  No abdominal pain nausea, vomiting.    GENITOURINARY: No Dysuria or Hematuria.    MUSCULOSKELETAL:  No new back pain or arthralgia.    LYMPHATICS:  Denies any abnormal swollen glands anywhere in the body.    NEUROLOGICAL : No tingling or numbness. No headache or dizziness. No seizures or balance problems.    SKIN: No new skin lesions.    ENDOCRINE : No new heat or cold intolerance. No new polyuria . No polydipsia.        PHYSICAL EXAMINATION:      VITAL SIGNS:  /55   Pulse 71   Temp 98.6 °F (37 °C) (Temporal)   Resp 18   Ht 170.2 cm (67.01\")   Wt 66.6 kg (146 lb 12.8 oz)   BMI 22.99 kg/m²       10/18/19  1415   Weight: 66.6 kg (146 lb 12.8 oz)         ECOG performance status: 2    CONSTITUTIONAL:  Not in any distress.    EYES: Mild conjunctival Pallor. No Icterus. No Pterygium. Extraocular Movements intact.No ptosis.    ENMT:  Normocephalic, Atraumatic.No Facial Asymmetry noted.    NECK:  No adenopathy.Trachea midline. NO JVD.    RESPIRATORY:  Fair air entry bilateral. No rhonchi or wheezing.Fair respiratory effort.    CARDIOVASCULAR:  S1, S2. Regular rate and rhythm. No murmur or gallop appreciated.    ABDOMEN:  Soft, nontender. Bowel sounds present in all four quadrants.  No Hepatosplenomegaly appreciated.    MUSCULOSKELETAL:  No edema.No Calf Tenderness.Decreased range of motion.    NEUROLOGIC:    No  Motor or sensory deficit appreciated. " Cranial Nerves 2-12 grossly intact.    SKIN : No new skin lesion identified. Skin is warm and dry to touch.    LYMPHATICS: No new enlarged lymph nodes in neck or supraclavicular area.    PSYCHIATRY: Alert, awake and oriented ×3.Normal affect.   Makes good eye contact.        DIAGNOSTIC DATA:    Glucose   Date Value Ref Range Status   10/03/2019 103 (H) 65 - 99 mg/dL Final     Sodium   Date Value Ref Range Status   10/03/2019 139 136 - 145 mmol/L Final     Potassium   Date Value Ref Range Status   10/03/2019 4.1 3.5 - 5.2 mmol/L Final     CO2   Date Value Ref Range Status   10/03/2019 28.0 22.0 - 29.0 mmol/L Final     Chloride   Date Value Ref Range Status   10/03/2019 101 98 - 107 mmol/L Final     Anion Gap   Date Value Ref Range Status   10/03/2019 10.0 5.0 - 15.0 mmol/L Final     Creatinine   Date Value Ref Range Status   10/03/2019 0.78 0.57 - 1.00 mg/dL Final     BUN   Date Value Ref Range Status   10/03/2019 12 8 - 23 mg/dL Final     BUN/Creatinine Ratio   Date Value Ref Range Status   10/03/2019 15.4 7.0 - 25.0 Final     Calcium   Date Value Ref Range Status   10/03/2019 9.3 8.6 - 10.5 mg/dL Final     eGFR Non  Amer   Date Value Ref Range Status   10/03/2019 74 >60 mL/min/1.73 Final     Alkaline Phosphatase   Date Value Ref Range Status   10/03/2019 65 39 - 117 U/L Final     Total Protein   Date Value Ref Range Status   10/03/2019 7.4 6.0 - 8.5 g/dL Final     ALT (SGPT)   Date Value Ref Range Status   10/03/2019 <5 1 - 33 U/L Final     AST (SGOT)   Date Value Ref Range Status   10/03/2019 9 1 - 32 U/L Final     Total Bilirubin   Date Value Ref Range Status   10/03/2019 0.2 0.2 - 1.2 mg/dL Final     Albumin   Date Value Ref Range Status   10/03/2019 4.70 3.50 - 5.20 g/dL Final     Globulin   Date Value Ref Range Status   10/03/2019 2.7 gm/dL Final     Lab Results   Component Value Date    WBC 6.59 10/03/2019    HGB 12.9 10/03/2019    HCT 38.1 10/03/2019    MCV 87.4 10/03/2019     10/03/2019      Lab Results   Component Value Date    NEUTROABS 3.91 10/03/2019     No results found for: , LABCA2, AFPTM, HCGQUANT, , CHROMGRNA, 5KXYW30CNC, CEA, REFLABREPO          Occult Blood, Fecal By Immunoassay - Stool, Per Rectum   Component  Ref Range & Units 2wk ago   Occult Blood, Fecal by Immunoassay  Negative Positive Abnormal     Resulting Agency [facility]         Specimen Collected: 10/04/19 10:55 Last Resulted: 10/04/19 11:59                   PATHOLOGY:  Pathology  report from September 24, 2010 showed:                          RADIOLOGY DATA:  CT of chest, abdomen and pelvis with contrast done on October 10, 2019 was reviewed, discussed with patient, it showed:  IMPRESSION:  1. No definite evidence of metastatic disease in the chest,  abdomen or pelvis.  2. No acute abnormality.              ASSESSMENT AND PLAN:      1.  Synchronous bilateral breast cancer:  - Patient  was diagnosed with bilateral synchronous breast cancer in August 2010.  -Patient had a bilateral mastectomy done a friend.  She had a stage I cancer on left side.  She had stage IIa cancer on right side with 2 lymph node having microscopic metastasis.  She was positive for estrogen and progesterone receptor negative for HER-2/ranjith.  -Patient initially received chemotherapy and dose dense fashion with Adriamycin and Cytoxan followed by Taxol that completed in February 2011.  - Patient was tried on 3 different hormone kind of medication but she could not tolerate it.  She has been off any kind of hormonal treatment since 2012.  - Since patient has a history of hormone receptor positive cancer and she could not tolerate any hormonal treatment, weight loss of 30 pound is worrisome for recurrence.  -Patient had a CT of chest abdomen and pelvis with contrast done on October 10, 2019 which does not show any evidence of recurrence of cancer.  Results were discussed with patient.  - We will ask patient to return to clinic in 4 months with  repeat CBC and CMP to be done on that day.    2.  Weight loss: Questionable etiology:  - Her history of bilateral breast cancer and not being able to complete treatment with hormonal treatment is worrisome factor for recurrence.  - CT of chest abdomen and pelvis with contrast done on October 10, 2019 does not show any evidence of recurrence of cancer which was discussed with patient.    3.  New onset of constipation:  -Patient complains of new onset of constipation since starting taking Sinemet.  -Stool for occult blood was positive on October 4, 2019.  Will refer her to gastroenterology team for further evaluation.  Tristen has been placed today on October 18, 2019     4.  Parkinson's disease on Sinemet    5.  Health maintenance: Patient quit smoking in 1981.  She had a bilateral mastectomy does not need mammogram.  She had hysterectomy in does not get Pap smear.    6.  Pain assessment:  -Patient denies any pain today.    7.  Advanced care planning: Patient is able to make her decision and remains full code for now.         Jatinder Olson MD  10/18/2019  2:29 PM        EMR Dragon/Transcription disclaimer:   Much of this encounter note is an electronic transcription/translation of spoken language to printed text. The electronic translation of spoken language may permit erroneous, or at times, nonsensical words or phrases to be inadvertently transcribed; Although I have reviewed the note for such errors, some may still exist.

## 2019-10-28 ENCOUNTER — OFFICE VISIT (OUTPATIENT)
Dept: GASTROENTEROLOGY | Facility: CLINIC | Age: 64
End: 2019-10-28

## 2019-10-28 VITALS
DIASTOLIC BLOOD PRESSURE: 66 MMHG | HEIGHT: 67 IN | HEART RATE: 84 BPM | BODY MASS INDEX: 22.73 KG/M2 | WEIGHT: 144.8 LBS | SYSTOLIC BLOOD PRESSURE: 100 MMHG

## 2019-10-28 DIAGNOSIS — R63.4 WEIGHT LOSS: Primary | ICD-10-CM

## 2019-10-28 DIAGNOSIS — R10.30 LOWER ABDOMINAL PAIN: ICD-10-CM

## 2019-10-28 DIAGNOSIS — K59.00 CONSTIPATION, UNSPECIFIED CONSTIPATION TYPE: ICD-10-CM

## 2019-10-28 DIAGNOSIS — R11.0 NAUSEA: ICD-10-CM

## 2019-10-28 PROCEDURE — 99204 OFFICE O/P NEW MOD 45 MIN: CPT | Performed by: INTERNAL MEDICINE

## 2019-10-28 RX ORDER — LUBIPROSTONE 24 UG/1
24 CAPSULE ORAL 2 TIMES DAILY WITH MEALS
Qty: 60 CAPSULE | Refills: 3 | Status: SHIPPED | OUTPATIENT
Start: 2019-10-28 | End: 2019-11-11

## 2019-10-28 RX ORDER — OMEPRAZOLE 40 MG/1
40 CAPSULE, DELAYED RELEASE ORAL DAILY
Qty: 30 CAPSULE | Refills: 11 | Status: SHIPPED | OUTPATIENT
Start: 2019-10-28 | End: 2019-11-11

## 2019-10-28 RX ORDER — DEXTROSE AND SODIUM CHLORIDE 5; .45 G/100ML; G/100ML
30 INJECTION, SOLUTION INTRAVENOUS CONTINUOUS PRN
Status: CANCELLED | OUTPATIENT
Start: 2019-11-14

## 2019-10-28 NOTE — PROGRESS NOTES
[facility]      Chief Complaint:   Chief Complaint   Patient presents with   • Constipation   • GI Bleeding       Subjective     HPI:   Ms. Fernandez is a 64-year-old  female with past medical history of bilateral breast cancer in 2010 status post mastectomy and chemotherapy, Parkinson's disease on Sinemet presenting for evaluation for constipation, nausea, mid abdominal pain and weight loss.  She has chronic constipation for past 1 year with bowel movements once in every 5 to 6 days.  She does not have urge to produce a bowel movement per patient.  Has mid abdominal pain which he improves with defecation.  She also has nausea and early satiety for past year and lost 30 pounds weight in past 1 and half years.  She denied vomiting, diarrhea, rectal bleeding or melena.  She takes ibuprofen and as-needed basis and currently not on PPI therapy.  Last colonoscopy in 2011 was unremarkable per patient.  She tried fiber supplements, stool softeners and over-the-counter laxatives including MiraLAX without much help.    Past Medical History:   Past Medical History:   Diagnosis Date   • Malignant neoplasm of breast (CMS/HCC)    • Palpitations    • Parkinson's disease (CMS/HCC)    • SOB (shortness of breath)        Past Surgical History:  Past Surgical History:   Procedure Laterality Date   • ANAL FISSURECTOMY     • CHOLECYSTECTOMY     • COLONOSCOPY      2011 as per patient - normal exam   • HYSTERECTOMY     • MASTECTOMY     • TONSILLECTOMY AND ADENOIDECTOMY     • TUBAL ABDOMINAL LIGATION         Family History:  Family History   Problem Relation Age of Onset   • Breast cancer Mother    • Lung cancer Father    • Breast cancer Maternal Aunt    • Breast cancer Maternal Grandmother    • Breast cancer Paternal Grandmother        Social History:   reports that she quit smoking about 39 years ago. Her smoking use included cigarettes. She smoked 0.50 packs per day. She has never used smokeless tobacco.  She reports that she drinks alcohol. She reports that she does not use drugs.    Medications:   Prior to Admission medications    Medication Sig Start Date End Date Taking? Authorizing Provider   carbidopa-levodopa (SINEMET)  MG per tablet 3 (Three) Times a Day. 2/16/18  Yes Fernando Gonzalez MD   diphenhydrAMINE (BENADRYL) 25 mg capsule Take 25 mg by mouth Every 6 (Six) Hours As Needed for Itching.   Yes Fernando Gonzalez MD   entacapone (COMTAN) 200 MG tablet Take 200 mg by mouth 3 (Three) Times a Day.   Yes Fernando Gonzalez MD   metoprolol succinate XL (TOPROL-XL) 25 MG 24 hr tablet TAKE 1 TABLET DAILY  Patient taking differently: TAKE HALF TABLET DAILY 1/15/19  Yes Gibran Pal MD   lubiprostone (AMITIZA) 24 MCG capsule Take 1 capsule by mouth 2 (Two) Times a Day With Meals. 10/28/19   Francy Romano MD   omeprazole (PrilOSEC) 40 MG capsule Take 1 capsule by mouth Daily. 10/28/19   Francy Romano MD       Allergies:  Patient has no known allergies.    ROS:    Review of Systems   Constitutional: Positive for unexpected weight change. Negative for chills, fatigue and fever.   HENT: Negative for congestion, ear discharge, hearing loss, nosebleeds and sore throat.    Eyes: Negative for pain, discharge and redness.   Respiratory: Negative for cough, chest tightness, shortness of breath and wheezing.    Cardiovascular: Negative for chest pain and palpitations.   Gastrointestinal: Positive for abdominal pain, constipation and nausea. Negative for abdominal distention, blood in stool, diarrhea and vomiting.   Endocrine: Negative for cold intolerance, polydipsia, polyphagia and polyuria.   Genitourinary: Negative for dysuria, flank pain, frequency, hematuria and urgency.   Musculoskeletal: Negative for arthralgias, back pain, joint swelling and myalgias.   Skin: Negative for color change, pallor and rash.   Neurological: Negative for tremors, seizures, syncope, weakness and headaches.  "  Hematological: Negative for adenopathy. Does not bruise/bleed easily.   Psychiatric/Behavioral: Negative for behavioral problems, confusion, dysphoric mood, hallucinations and suicidal ideas. The patient is not nervous/anxious.      Objective     Blood pressure 100/66, pulse 84, height 170.2 cm (67\"), weight 65.7 kg (144 lb 12.8 oz).    Physical Exam   Constitutional: She is oriented to person, place, and time. She appears well-developed and well-nourished.   HENT:   Head: Normocephalic and atraumatic.   Mouth/Throat: Oropharynx is clear and moist.   Eyes: Conjunctivae and EOM are normal. Pupils are equal, round, and reactive to light.   Neck: Normal range of motion. Neck supple. No thyromegaly present.   Cardiovascular: Normal rate, regular rhythm and normal heart sounds.   No murmur heard.  Pulmonary/Chest: Effort normal and breath sounds normal. She has no wheezes.   Abdominal: Soft. Bowel sounds are normal. She exhibits no distension and no mass. There is no tenderness. No hernia.   Genitourinary:   Genitourinary Comments: No lesions noted   Musculoskeletal: Normal range of motion. She exhibits no edema or tenderness.   Lymphadenopathy:     She has no cervical adenopathy.   Neurological: She is alert and oriented to person, place, and time. No cranial nerve deficit.   Skin: Skin is warm and dry. No rash noted.   Psychiatric: She has a normal mood and affect. Thought content normal.      Extremities: No edema, cyanosis or clubbing.    Assessment/Plan   Lorin was seen today for constipation and gi bleeding.    Diagnoses and all orders for this visit:    Weight loss  -     Case Request; Standing  -     Implement Anesthesia Orders Day of Procedure; Standing  -     Obtain Informed Consent; Standing  -     POC Glucose Once; Standing  -     dextrose 5 % and sodium chloride 0.45 % infusion  -     Case Request    Nausea  -     Case Request; Standing  -     Implement Anesthesia Orders Day of Procedure; Standing  -     " Obtain Informed Consent; Standing  -     POC Glucose Once; Standing  -     dextrose 5 % and sodium chloride 0.45 % infusion  -     Case Request    Constipation, unspecified constipation type  -     Case Request; Standing  -     Implement Anesthesia Orders Day of Procedure; Standing  -     Obtain Informed Consent; Standing  -     POC Glucose Once; Standing  -     dextrose 5 % and sodium chloride 0.45 % infusion  -     Case Request    Lower abdominal pain  -     Case Request; Standing  -     Implement Anesthesia Orders Day of Procedure; Standing  -     Obtain Informed Consent; Standing  -     POC Glucose Once; Standing  -     dextrose 5 % and sodium chloride 0.45 % infusion  -     Case Request    Other orders  -     omeprazole (PrilOSEC) 40 MG capsule; Take 1 capsule by mouth Daily.  -     lubiprostone (AMITIZA) 24 MCG capsule; Take 1 capsule by mouth 2 (Two) Times a Day With Meals.    1.  Constipation with mid abdominal pain and weight loss rule out colorectal neoplasia.  Could also be due to pelvic floor dysfunction given the lack of urge to defecate.  Add amities on continue stool softeners and fiber supplements.  Schedule colonoscopy for further evaluation.  May need anorectal manometry if colonoscopy is unremarkable and constipation continues.  2.  Nausea, early satiety and weight loss rule out peptic ulcer disease, gastric outlet obstruction gastroparesis.  Patient is currently taking ibuprofen on as-needed basis.  Add Prilosec and discontinue ibuprofen.  Schedule EGD for further evaluation.  3.  History of breast cancer, patient needs high risk screening for colorectal neoplasia.  Schedule colonoscopy.    ESOPHAGOGASTRODUODENOSCOPY (N/A), COLONOSCOPY (N/A)     Diagnosis Plan   1. Weight loss  Case Request    Implement Anesthesia Orders Day of Procedure    Obtain Informed Consent    POC Glucose Once    dextrose 5 % and sodium chloride 0.45 % infusion    Case Request   2. Nausea  Case Request    Implement  Anesthesia Orders Day of Procedure    Obtain Informed Consent    POC Glucose Once    dextrose 5 % and sodium chloride 0.45 % infusion    Case Request   3. Constipation, unspecified constipation type  Case Request    Implement Anesthesia Orders Day of Procedure    Obtain Informed Consent    POC Glucose Once    dextrose 5 % and sodium chloride 0.45 % infusion    Case Request   4. Lower abdominal pain  Case Request    Implement Anesthesia Orders Day of Procedure    Obtain Informed Consent    POC Glucose Once    dextrose 5 % and sodium chloride 0.45 % infusion    Case Request       Anticipated Surgical Procedure:  No orders of the defined types were placed in this encounter.      The risks, benefits, and alternatives of this procedure have been discussed with the patient or the responsible party- the patient understands and agrees to proceed.            This document has been electronically signed by Francy Romano MD on October 28, 2019 3:49 PM

## 2019-10-29 RX ORDER — SODIUM, POTASSIUM,MAG SULFATES 17.5-3.13G
2 SOLUTION, RECONSTITUTED, ORAL ORAL EVERY 12 HOURS
Qty: 2 BOTTLE | Refills: 0 | Status: ON HOLD | OUTPATIENT
Start: 2019-10-29 | End: 2019-11-14

## 2019-11-11 RX ORDER — METOPROLOL SUCCINATE 25 MG/1
12.5 TABLET, EXTENDED RELEASE ORAL DAILY
COMMUNITY

## 2019-11-14 ENCOUNTER — HOSPITAL ENCOUNTER (OUTPATIENT)
Facility: HOSPITAL | Age: 64
Setting detail: HOSPITAL OUTPATIENT SURGERY
Discharge: HOME | End: 2019-11-14
Attending: INTERNAL MEDICINE | Admitting: INTERNAL MEDICINE

## 2019-11-14 ENCOUNTER — ANESTHESIA (OUTPATIENT)
Dept: GASTROENTEROLOGY | Facility: HOSPITAL | Age: 64
End: 2019-11-14

## 2019-11-14 ENCOUNTER — ANESTHESIA EVENT (OUTPATIENT)
Dept: GASTROENTEROLOGY | Facility: HOSPITAL | Age: 64
End: 2019-11-14

## 2019-11-14 VITALS
RESPIRATION RATE: 18 BRPM | SYSTOLIC BLOOD PRESSURE: 119 MMHG | HEART RATE: 74 BPM | TEMPERATURE: 96.8 F | OXYGEN SATURATION: 99 % | WEIGHT: 139.25 LBS | HEIGHT: 67 IN | BODY MASS INDEX: 21.85 KG/M2 | DIASTOLIC BLOOD PRESSURE: 52 MMHG

## 2019-11-14 DIAGNOSIS — K59.00 CONSTIPATION, UNSPECIFIED CONSTIPATION TYPE: ICD-10-CM

## 2019-11-14 DIAGNOSIS — R10.30 LOWER ABDOMINAL PAIN: ICD-10-CM

## 2019-11-14 DIAGNOSIS — R11.0 NAUSEA: ICD-10-CM

## 2019-11-14 DIAGNOSIS — R63.4 WEIGHT LOSS: ICD-10-CM

## 2019-11-14 PROCEDURE — 88305 TISSUE EXAM BY PATHOLOGIST: CPT | Performed by: INTERNAL MEDICINE

## 2019-11-14 PROCEDURE — 88342 IMHCHEM/IMCYTCHM 1ST ANTB: CPT | Performed by: PATHOLOGY

## 2019-11-14 PROCEDURE — 88342 IMHCHEM/IMCYTCHM 1ST ANTB: CPT | Performed by: INTERNAL MEDICINE

## 2019-11-14 PROCEDURE — 45380 COLONOSCOPY AND BIOPSY: CPT | Performed by: INTERNAL MEDICINE

## 2019-11-14 PROCEDURE — 25010000002 PROPOFOL 10 MG/ML EMULSION: Performed by: NURSE ANESTHETIST, CERTIFIED REGISTERED

## 2019-11-14 PROCEDURE — 88305 TISSUE EXAM BY PATHOLOGIST: CPT | Performed by: PATHOLOGY

## 2019-11-14 PROCEDURE — 45385 COLONOSCOPY W/LESION REMOVAL: CPT | Performed by: INTERNAL MEDICINE

## 2019-11-14 PROCEDURE — 43239 EGD BIOPSY SINGLE/MULTIPLE: CPT | Performed by: INTERNAL MEDICINE

## 2019-11-14 RX ORDER — PROPOFOL 10 MG/ML
VIAL (ML) INTRAVENOUS AS NEEDED
Status: DISCONTINUED | OUTPATIENT
Start: 2019-11-14 | End: 2019-11-14 | Stop reason: SURG

## 2019-11-14 RX ORDER — LIDOCAINE HYDROCHLORIDE 20 MG/ML
INJECTION, SOLUTION INTRAVENOUS AS NEEDED
Status: DISCONTINUED | OUTPATIENT
Start: 2019-11-14 | End: 2019-11-14 | Stop reason: SURG

## 2019-11-14 RX ORDER — DEXTROSE AND SODIUM CHLORIDE 5; .45 G/100ML; G/100ML
30 INJECTION, SOLUTION INTRAVENOUS CONTINUOUS PRN
Status: DISCONTINUED | OUTPATIENT
Start: 2019-11-14 | End: 2019-11-14 | Stop reason: HOSPADM

## 2019-11-14 RX ADMIN — DEXTROSE AND SODIUM CHLORIDE 30 ML/HR: 5; 450 INJECTION, SOLUTION INTRAVENOUS at 14:26

## 2019-11-14 RX ADMIN — PROPOFOL 30 MG: 10 INJECTION, EMULSION INTRAVENOUS at 14:43

## 2019-11-14 RX ADMIN — PROPOFOL 30 MG: 10 INJECTION, EMULSION INTRAVENOUS at 14:32

## 2019-11-14 RX ADMIN — PROPOFOL 30 MG: 10 INJECTION, EMULSION INTRAVENOUS at 14:37

## 2019-11-14 RX ADMIN — PROPOFOL 30 MG: 10 INJECTION, EMULSION INTRAVENOUS at 14:34

## 2019-11-14 RX ADMIN — LIDOCAINE HYDROCHLORIDE 60 MG: 20 INJECTION, SOLUTION INTRAVENOUS at 14:31

## 2019-11-14 RX ADMIN — PROPOFOL 30 MG: 10 INJECTION, EMULSION INTRAVENOUS at 14:40

## 2019-11-14 RX ADMIN — PROPOFOL 30 MG: 10 INJECTION, EMULSION INTRAVENOUS at 14:46

## 2019-11-14 RX ADMIN — PROPOFOL 100 MG: 10 INJECTION, EMULSION INTRAVENOUS at 14:31

## 2019-11-14 NOTE — ANESTHESIA POSTPROCEDURE EVALUATION
Patient: Lorin Fernandez    Procedure Summary     Date:  11/14/19 Room / Location:  [facility] ENDOSCOPY 1 / [facility] ENDOSCOPY    Anesthesia Start:  1425 Anesthesia Stop:  1452    Procedures:       ESOPHAGOGASTRODUODENOSCOPY (N/A )      COLONOSCOPY (N/A ) Diagnosis:       Weight loss      Nausea      Constipation, unspecified constipation type      Lower abdominal pain      (Weight loss [R63.4])      (Nausea [R11.0])      (Constipation, unspecified constipation type [K59.00])      (Lower abdominal pain [R10.30])    Surgeon:  Francy Romano MD Provider:  Juantio Box CRNA    Anesthesia Type:  MAC ASA Status:  3          Anesthesia Type: MAC  Last vitals  BP   113/60 (11/14/19 1406)   Temp   98.3 °F (36.8 °C) (11/14/19 1406)   Pulse   96 (11/14/19 1406)   Resp   16 (11/14/19 1406)     SpO2   100 % (11/14/19 1406)     Post Anesthesia Care and Evaluation    Patient location during evaluation: bedside  Patient participation: waiting for patient participation  Level of consciousness: responsive to verbal stimuli  Pain management: adequate  Airway patency: patent  Anesthetic complications: No anesthetic complications  PONV Status: none  Cardiovascular status: acceptable  Respiratory status: acceptable  Hydration status: acceptable

## 2019-11-18 LAB
LAB AP CASE REPORT: NORMAL
LAB AP SPECIAL STAINS: NORMAL
PATH REPORT.FINAL DX SPEC: NORMAL
PATH REPORT.GROSS SPEC: NORMAL

## 2019-11-25 ENCOUNTER — OFFICE VISIT (OUTPATIENT)
Dept: GASTROENTEROLOGY | Facility: CLINIC | Age: 64
End: 2019-11-25

## 2019-11-25 VITALS
BODY MASS INDEX: 22.98 KG/M2 | WEIGHT: 146.4 LBS | DIASTOLIC BLOOD PRESSURE: 62 MMHG | HEIGHT: 67 IN | OXYGEN SATURATION: 99 % | HEART RATE: 68 BPM | SYSTOLIC BLOOD PRESSURE: 112 MMHG

## 2019-11-25 DIAGNOSIS — K59.09 OTHER CONSTIPATION: ICD-10-CM

## 2019-11-25 DIAGNOSIS — R11.0 NAUSEA: Primary | ICD-10-CM

## 2019-11-25 DIAGNOSIS — R63.4 WEIGHT LOSS: ICD-10-CM

## 2019-11-25 PROCEDURE — 99213 OFFICE O/P EST LOW 20 MIN: CPT | Performed by: INTERNAL MEDICINE

## 2019-11-25 NOTE — PATIENT INSTRUCTIONS
High-Protein and High-Calorie Diet  Eating high-protein and high-calorie foods can help you to gain weight, heal after an injury, and recover after an illness or surgery. The specific amount of daily protein and calories you need depends on:  · Your body weight.  · The reason this diet is recommended for you.  What is my plan?  Generally, a high-protein, high-calorie diet involves:  · Eating 250-500 extra calories each day.  · Making sure that you get enough of your daily calories from protein. Ask your health care provider how many of your calories should come from protein.  Talk with a health care provider, such as a diet and nutrition specialist (dietitian), about how much protein and how many calories you need each day. Follow the diet as directed by your health care provider.  What are tips for following this plan?    Preparing meals  · Add whole milk, half-and-half, or heavy cream to cereal, pudding, soup, or hot cocoa.  · Add whole milk to instant breakfast drinks.  · Add peanut butter to oatmeal or smoothies.  · Add powdered milk to baked goods, smoothies, or milkshakes.  · Add powdered milk, cream, or butter to mashed potatoes.  · Add cheese to cooked vegetables.  · Make whole-milk yogurt parfaits. Top them with granola, fruit, or nuts.  · Add cottage cheese to your fruit.  · Add avocado, cheese, or both to sandwiches or salads.  · Add meat, poultry, or seafood to rice, pasta, casseroles, salads, and soups.  · Use mayonnaise when making egg salad, chicken salad, or tuna salad.  · Use peanut butter as a dip for vegetables or as a topping for pretzels, celery, or crackers.  · Add beans to casseroles, dips, and spreads.  · Add pureed beans to sauces and soups.  · Replace calorie-free drinks with calorie-containing drinks, such as milk and fruit juice.  · Replace water with milk or heavy cream when making foods such as oatmeal, pudding, or cocoa.  General instructions  · Ask your health care provider if you  should take a nutritional supplement.  · Try to eat six small meals each day instead of three large meals.  · Eat a balanced diet. In each meal, include one food that is high in protein.  · Keep nutritious snacks available, such as nuts, trail mixes, dried fruit, and yogurt.  · If you have kidney disease or diabetes, talk with your health care provider about how much protein is safe for you. Too much protein may put extra stress on your kidneys.  · Drink your calories. Choose high-calorie drinks and have them after your meals.  What high-protein foods should I eat?    Vegetables  Soybeans. Peas.  Grains  Quinoa. Bulgur wheat.  Meats and other proteins  Beef, pork, and poultry. Fish and seafood. Eggs. Tofu. Textured vegetable protein (TVP). Peanut butter. Nuts and seeds. Dried beans. Protein powders.  Dairy  Whole milk. Whole-milk yogurt. Powdered milk. Cheese. Cottage Cheese. Eggnog.  Beverages  High-protein supplement drinks. Soy milk.  Other foods  Protein bars.  The items listed above may not be a complete list of high-protein foods and beverages. Contact a dietitian for more options.  What high-calorie foods should I eat?  Fruits  Dried fruit. Fruit leather. Canned fruit in syrup. Fruit juice. Avocado.  Vegetables  Vegetables cooked in oil or butter. Fried potatoes.  Grains  Pasta. Quick breads. Muffins. Pancakes. Ready-to-eat cereal.  Meats and other proteins  Peanut butter. Nuts and seeds.  Dairy  Heavy cream. Whipped cream. Cream cheese. Sour cream. Ice cream. Custard. Pudding.  Beverages  Meal-replacement beverages. Nutrition shakes. Fruit juice. Sugar-sweetened soft drinks.  Seasonings and condiments  Salad dressing. Mayonnaise. Joseluis sauce. Fruit preserves or jelly. Honey. Syrup.  Sweets and desserts  Cake. Cookies. Pie. Pastries. Candy bars. Chocolate.  Fats and oils  Butter or margarine. Oil. Gravy.  Other foods  Meal-replacement bars.  The items listed above may not be a complete list of high-calorie  foods and beverages. Contact a dietitian for more options.  Summary  · A high-protein, high-calorie diet can help you gain weight or heal faster after an injury, illness, or surgery.  · To increase your protein and calories, add ingredients such as whole milk, peanut butter, cheese, beans, meat, or seafood to meal items.  · To get enough extra calories each day, include high-calorie foods and beverages at each meal.  · Adding a high-calorie drink or shake can be an easy way to help you get enough calories each day. Talk with your healthcare provider or dietitian about the best options for you.  This information is not intended to replace advice given to you by your health care provider. Make sure you discuss any questions you have with your health care provider.  Document Released: 12/18/2006 Document Revised: 10/30/2018 Document Reviewed: 10/30/2018  [employer] Interactive Patient Education © 2019 [employer]

## 2019-11-25 NOTE — PROGRESS NOTES
Chief Complaint   Patient presents with   • Weight Loss     EGD/Colonoscopy Performed 11/14/2019       Subjective    Lorin Michelle Fernandez is a 64 y.o. female.    History of Present Illness    Patient presented to GI clinic for follow-up visit today.  She feels well currently.  No further abdominal pain, nausea or vomiting.  Gained 7 pounds weight since last visit.  Bowel movements are regular.  Colonoscopy was consistent with adenomatous polyps, inadequate prep and hemorrhoids.  EGD was consistent with hiatal hernia, esophagitis and gastritis.  Path was unremarkable.     The following portions of the patient's history were reviewed and updated as appropriate:   Past Medical History:   Diagnosis Date   • Malignant neoplasm of breast (CMS/HCC)    • Palpitations    • Parkinson's disease (CMS/HCC)    • SOB (shortness of breath)      Past Surgical History:   Procedure Laterality Date   • ANAL FISSURECTOMY     • CHOLECYSTECTOMY     • COLONOSCOPY      2011 as per patient - normal exam   • COLONOSCOPY N/A 11/14/2019    Procedure: COLONOSCOPY;  Surgeon: Francy Romano MD;  Location: [facility] ENDOSCOPY;  Service: Gastroenterology   • ENDOSCOPY N/A 11/14/2019    Procedure: ESOPHAGOGASTRODUODENOSCOPY;  Surgeon: Francy Romano MD;  Location: [facility] ENDOSCOPY;  Service: Gastroenterology   • HYSTERECTOMY     • MASTECTOMY     • TONSILLECTOMY AND ADENOIDECTOMY     • TUBAL ABDOMINAL LIGATION     • UPPER GASTROINTESTINAL ENDOSCOPY  11/14/2019     Family History   Problem Relation Age of Onset   • Breast cancer Mother    • Lung cancer Father    • Breast cancer Maternal Aunt    • Breast cancer Maternal Grandmother    • Breast cancer Paternal Grandmother      OB History     No data available        Prior to Admission medications    Medication Sig Start Date End Date Taking? Authorizing Provider   carbidopa-levodopa (SINEMET)  MG per tablet Take 1 tablet by mouth 3 (Three) Times a Day. 2/16/18  Yes Provider, MD Fernando    diphenhydrAMINE (BENADRYL) 25 mg capsule Take 25 mg by mouth Every 6 (Six) Hours As Needed for Itching.   Yes ProviderFernando MD   entacapone (COMTAN) 200 MG tablet Take 200 mg by mouth 3 (Three) Times a Day.   Yes ProviderFernando MD   metoprolol succinate XL (TOPROL-XL) 25 MG 24 hr tablet Take 12.5 mg by mouth Daily.   Yes Provider, MD Fernando     No Known Allergies  Social History     Socioeconomic History   • Marital status:      Spouse name: Not on file   • Number of children: Not on file   • Years of education: Not on file   • Highest education level: Not on file   Tobacco Use   • Smoking status: Former Smoker     Packs/day: 0.50     Types: Cigarettes     Last attempt to quit: 1980     Years since quittin.9   • Smokeless tobacco: Never Used   Substance and Sexual Activity   • Alcohol use: Yes     Comment: social   • Drug use: No   • Sexual activity: Defer       Review of Systems  Review of Systems   Constitutional: Positive for unexpected weight change. Negative for chills, fatigue and fever.   HENT: Negative for congestion, ear discharge, hearing loss, nosebleeds and sore throat.    Eyes: Negative for pain, discharge and redness.   Respiratory: Negative for cough, chest tightness, shortness of breath and wheezing.    Cardiovascular: Negative for chest pain and palpitations.   Gastrointestinal: Negative for abdominal distention, abdominal pain, blood in stool, constipation, diarrhea, nausea and vomiting.   Endocrine: Negative for cold intolerance, polydipsia, polyphagia and polyuria.   Genitourinary: Negative for dysuria, flank pain, frequency, hematuria and urgency.   Musculoskeletal: Negative for arthralgias, back pain, joint swelling and myalgias.   Skin: Negative for color change, pallor and rash.   Neurological: Negative for tremors, seizures, syncope, weakness and headaches.   Hematological: Negative for adenopathy. Does not bruise/bleed easily.   Psychiatric/Behavioral:  "Negative for behavioral problems, confusion, dysphoric mood, hallucinations and suicidal ideas. The patient is not nervous/anxious.         /62   Pulse 68   Ht 170.2 cm (67\")   Wt 66.4 kg (146 lb 6.4 oz)   SpO2 99%   BMI 22.93 kg/m²     Objective    Physical Exam   Constitutional: She is oriented to person, place, and time. She appears well-developed and well-nourished.   HENT:   Head: Normocephalic and atraumatic.   Mouth/Throat: Oropharynx is clear and moist.   Eyes: Conjunctivae and EOM are normal. Pupils are equal, round, and reactive to light.   Neck: Normal range of motion. Neck supple. No thyromegaly present.   Cardiovascular: Normal rate, regular rhythm and normal heart sounds.   No murmur heard.  Pulmonary/Chest: Effort normal and breath sounds normal. She has no wheezes.   Abdominal: Soft. Bowel sounds are normal. She exhibits no distension and no mass. There is no tenderness. No hernia.   Genitourinary:   Genitourinary Comments: No lesions noted   Musculoskeletal: Normal range of motion. She exhibits no edema or tenderness.   Lymphadenopathy:     She has no cervical adenopathy.   Neurological: She is alert and oriented to person, place, and time. No cranial nerve deficit.   Skin: Skin is warm and dry. No rash noted.   Psychiatric: She has a normal mood and affect. Thought content normal.     Admission on 11/14/2019, Discharged on 11/14/2019   Component Date Value Ref Range Status   • Case Report 11/14/2019    Final                    Value:Surgical Pathology Report                         Case: [id number]                                  Authorizing Provider:  Francy Romano MD      Collected:           11/14/2019 02:36 PM          Ordering Location:     [facility]             Received:            11/15/2019 07:19 AM                                 [facility]                                                     Pathologist:           Jensen Samayoa MD                     "                                     Specimens:   1) - Gastric, Antrum                                                                                2) - Esophagus, Distal, EGJ                                                                         3) - Large Intestine, Left / Descending Colon, POLYP                                                4) - Large Intestine, Rectum, POLYP                                                       • Final Diagnosis 11/14/2019    Final                    Value:This result contains rich text formatting which cannot be displayed here.   • Gross Description 11/14/2019    Final                    Value:This result contains rich text formatting which cannot be displayed here.   • Special Stains 11/14/2019    Final                    Value:This result contains rich text formatting which cannot be displayed here.     Assessment/Plan    No diagnosis found..   1.  Constipation with mid abdominal pain and weight loss improved.  Continue Amitiza, stool softeners and fiber supplements.    2.  Nausea, early satiety and weight loss, improved. continue Prilosec.  3.  History of breast cancer, and adenomatous colon polyps upon recent colonoscopy with inadequate prep.  Repeat colonoscopy in 1 year.    Orders placed during this encounter include:  No orders of the defined types were placed in this encounter.      * Surgery not found *    Review and/or summary of lab tests, radiology, procedures, medications. Review and summary of old records and obtaining of history. The risks and benefits of my recommendations, as well as other treatment options were discussed with the patient today. Questions were answered.    No orders of the defined types were placed in this encounter.      Follow-up: Return in about 3 months (around 2/25/2020).               Results for orders placed or performed during the hospital encounter of 11/14/19   Tissue Pathology Exam   Result Value Ref Range    Case Report        Surgical Pathology Report                         Case: [id number]                                  Authorizing Provider:  Francy Romano MD      Collected:           11/14/2019 02:36 PM          Ordering Location:     [facility]             Received:            11/15/2019 07:19 AM                                 [facility]                                                     Pathologist:           Jensen Samayoa MD                                                         Specimens:   1) - Gastric, Antrum                                                                                2) - Esophagus, Distal, EGJ                                                                         3) - Large Intestine, Left / Descending Colon, POLYP                                                4) - Large Intestine, Rectum, POLYP                                                        Final Diagnosis       1.  MUCOSA, ANTRUM OF STOMACH:  CHRONIC GASTRITIS.  NEGATIVE FOR HELICOBACTER PYLORI (HP IMMUNOSTAIN).    2.  MUCOSA, ESOPHAGOGASTRIC JUNCTION:  MILD CHRONIC INFLAMMATION OF CARDIAC GASTRIC MUCOSA AND SQUAMOUS MUCOSA.  NEGATIVE FOR DYSPLASIA AND GOBLET CELL METAPLASIA.    3.  POLYP,  DESCENDING COLON:  TUBULAR ADENOMA.    4.  POLYP, RECTUM:  HYPERPLASTIC POLYP.      Gross Description       Received for examination are 4 containers, each of which have nodular bits of white soft tissue measuring 0.3-0.5 cc in aggregate.  All specimens are embedded as labeled.  1A antrum of stomach; 2A esophagogastric junction; 3A polyp, descending colon; 4A polyp, rectum.      Special Stains       Helicobacter pylori (HP) immunostain is performed (Block 1) because an appropriate inflammatory milieu is present and organisms are not seen on H & E stained slides.    HP immunostain was developed and its performance characteristics determined by [facility].  It has not been cleared or approved  by the U.S. Food and Drug Administration.  The FDA has determined that such clearance or approval is not necessary.  This test is used for clinical purposes.  It should not be regarded as investigational or for research.  This laboratory is certified under the Clinical Laboratory Improvement Amendments of 1988 (CLIA-88) as qualified to perform high complexity clinical laboratory testing.    All controls show appropriate reactivity.         Results for orders placed or performed in visit on 10/03/19   Occult Blood, Fecal By Immunoassay - Stool, Per Rectum   Result Value Ref Range    Occult Blood, Fecal by Immunoassay Positive (A) Negative   CBC Auto Differential   Result Value Ref Range    WBC 6.59 3.40 - 10.80 10*3/mm3    RBC 4.36 3.77 - 5.28 10*6/mm3    Hemoglobin 12.9 12.0 - 15.9 g/dL    Hematocrit 38.1 34.0 - 46.6 %    MCV 87.4 79.0 - 97.0 fL    MCH 29.6 26.6 - 33.0 pg    MCHC 33.9 31.5 - 35.7 g/dL    RDW 13.1 12.3 - 15.4 %    RDW-SD 41.3 37.0 - 54.0 fl    MPV 9.1 6.0 - 12.0 fL    Platelets 294 140 - 450 10*3/mm3    Neutrophil % 59.3 42.7 - 76.0 %    Lymphocyte % 28.5 19.6 - 45.3 %    Monocyte % 9.9 5.0 - 12.0 %    Eosinophil % 0.9 0.3 - 6.2 %    Basophil % 1.1 0.0 - 1.5 %    Immature Grans % 0.3 0.0 - 0.5 %    Neutrophils, Absolute 3.91 1.70 - 7.00 10*3/mm3    Lymphocytes, Absolute 1.88 0.70 - 3.10 10*3/mm3    Monocytes, Absolute 0.65 0.10 - 0.90 10*3/mm3    Eosinophils, Absolute 0.06 0.00 - 0.40 10*3/mm3    Basophils, Absolute 0.07 0.00 - 0.20 10*3/mm3    Immature Grans, Absolute 0.02 0.00 - 0.05 10*3/mm3    nRBC 0.0 0.0 - 0.2 /100 WBC   Comprehensive Metabolic Panel   Result Value Ref Range    Glucose 103 (H) 65 - 99 mg/dL    BUN 12 8 - 23 mg/dL    Creatinine 0.78 0.57 - 1.00 mg/dL    Sodium 139 136 - 145 mmol/L    Potassium 4.1 3.5 - 5.2 mmol/L    Chloride 101 98 - 107 mmol/L    CO2 28.0 22.0 - 29.0 mmol/L    Calcium 9.3 8.6 - 10.5 mg/dL    Total Protein 7.4 6.0 - 8.5 g/dL    Albumin 4.70 3.50 - 5.20 g/dL     ALT (SGPT) <5 1 - 33 U/L    AST (SGOT) 9 1 - 32 U/L    Alkaline Phosphatase 65 39 - 117 U/L    Total Bilirubin 0.2 0.2 - 1.2 mg/dL    eGFR Non African Amer 74 >60 mL/min/1.73    Globulin 2.7 gm/dL    A/G Ratio 1.7 g/dL    BUN/Creatinine Ratio 15.4 7.0 - 25.0    Anion Gap 10.0 5.0 - 15.0 mmol/L   Results for orders placed or performed in visit on 05/09/17   CBC Auto Differential   Result Value Ref Range    WBC 8.63 3.20 - 9.80 10*3/mm3    RBC 4.55 3.77 - 5.16 10*6/mm3    Hemoglobin 12.8 12.0 - 15.5 g/dL    Hematocrit 38.5 35.0 - 45.0 %    MCV 84.6 80.0 - 98.0 fL    MCH 28.1 26.5 - 34.0 pg    MCHC 33.2 31.4 - 36.0 g/dL    RDW 13.2 11.5 - 14.5 %    RDW-SD 40.5 36.4 - 46.3 fl    MPV 9.5 8.0 - 12.0 fL    Platelets 294 150 - 450 10*3/mm3    Neutrophil % 70.0 37.0 - 80.0 %    Lymphocyte % 21.9 10.0 - 50.0 %    Monocyte % 6.8 0.0 - 12.0 %    Eosinophil % 0.6 0.0 - 7.0 %    Basophil % 0.5 0.0 - 2.0 %    Immature Grans % 0.2 0.0 - 0.5 %    Neutrophils, Absolute 6.04 2.00 - 8.60 10*3/mm3    Lymphocytes, Absolute 1.89 0.60 - 4.20 10*3/mm3    Monocytes, Absolute 0.59 0.00 - 0.90 10*3/mm3    Eosinophils, Absolute 0.05 0.00 - 0.70 10*3/mm3    Basophils, Absolute 0.04 0.00 - 0.20 10*3/mm3    Immature Grans, Absolute 0.02 0.00 - 0.02 10*3/mm3   Sedimentation rate, automated   Result Value Ref Range    Sed Rate 13 0 - 20 mm/hr   C-reactive Protein   Result Value Ref Range    C-Reactive Protein 0.90 0.00 - 1.00 mg/dL   Results for orders placed or performed during the hospital encounter of 11/01/16   CBC & Differential   Result Value Ref Range    WBC 5.4 3.2 - 9.8 x1000/uL    RBC 4.63 3.77 - 5.16 per/mm3    Hemoglobin 13.1 12.0 - 15.5 gm/dl    Hematocrit 39.0 35.0 - 45.0 %    MCV 84.2 80.0 - 98.0 fl    MCH 28.3 26.0 - 34.0 pg    MCHC 33.6 31.4 - 36.0 gm/dl    RDW 13.2 11.5 - 14.5 %    Platelets 240 150 - 450 x1000/mm3    MPV 8.8 8.0 - 12.0 fl    Neutrophil Rel % 67.6 37.0 - 80.0 %    Lymphocyte Rel % 20.5 10.0 - 50.0 %    Monocyte  Rel % 10.4 0.0 - 12.0 %    Eosinophil Rel % 0.7 0.0 - 7.0 %    Basophil Rel % 0.6 0.0 - 2.0 %    Immature Granulocyte Rel % 0.20 0.00 - 0.50 %    Neutrophils Absolute 3.62 2.00 - 8.60 x1000/uL    Lymphocytes Absolute 1.10 0.60 - 4.20 x1000/uL    Monocytes Absolute 0.56 0.00 - 0.90 x1000/uL    Eosinophils Absolute 0.04 0.00 - 0.70 x1000/uL    Basophils Absolute 0.03 0.00 - 0.20 x1000/uL    Immature Granulocytes Absolute 0.010 0.005 - 0.022 x1000/uL   Comprehensive Metabolic Panel   Result Value Ref Range    Sodium 140 137 - 145 mmol/L    Potassium 3.8 3.5 - 5.1 mmol/L    Chloride 99 95 - 110 mmol/L    CO2 30 22 - 31 mmol/L    Anion Gap 11.0 5.0 - 15.0 mmol/L    Glucose 96 60 - 100 mg/dl    BUN 11 7 - 21 mg/dl    Creatinine 0.8 0.5 - 1.0 mg/dl    GFR MDRD Non  73 45 - 104 mL/min/1.73 sq.M    GFR MDRD  88 45 - 104 mL/min/1.73 sq.M    Calcium 8.8 8.4 - 10.2 mg/dl    Total Protein 7.6 6.3 - 8.6 gm/dl    Albumin 4.5 3.4 - 4.8 gm/dl    Total Bilirubin 0.6 0.2 - 1.3 mg/dl    Alkaline Phosphatase 83 38 - 126 U/L    AST (SGOT) 28 14 - 36 U/L    ALT (SGPT) 25 9 - 52 U/L     *Note: Due to a large number of results and/or encounters for the requested time period, some results have not been displayed. A complete set of results can be found in Results Review.         This document has been electronically signed by Francy Romano MD on November 25, 2019 2:53 PM

## 2020-02-28 ENCOUNTER — APPOINTMENT (OUTPATIENT)
Dept: ONCOLOGY | Facility: HOSPITAL | Age: 65
End: 2020-02-28

## 2020-02-28 ENCOUNTER — APPOINTMENT (OUTPATIENT)
Dept: ONCOLOGY | Facility: CLINIC | Age: 65
End: 2020-02-28

## 2022-08-08 ENCOUNTER — HOSPITAL ENCOUNTER (OUTPATIENT)
Dept: NUCLEAR MEDICINE | Facility: HOSPITAL | Age: 67
Discharge: HOME | End: 2022-08-08

## 2022-08-08 DIAGNOSIS — Z90.13 HISTORY OF BILATERAL MASTECTOMY: ICD-10-CM

## 2022-08-08 DIAGNOSIS — C50.912 BILATERAL MALIGNANT NEOPLASM OF BREAST IN FEMALE, UNSPECIFIED ESTROGEN RECEPTOR STATUS, UNSPECIFIED SITE OF BREAST: ICD-10-CM

## 2022-08-08 DIAGNOSIS — R07.89 CHEST WALL PAIN: ICD-10-CM

## 2022-08-08 DIAGNOSIS — C50.911 BILATERAL MALIGNANT NEOPLASM OF BREAST IN FEMALE, UNSPECIFIED ESTROGEN RECEPTOR STATUS, UNSPECIFIED SITE OF BREAST: ICD-10-CM

## 2022-08-08 PROCEDURE — A9500 TC99M SESTAMIBI: HCPCS | Performed by: FAMILY MEDICINE

## 2022-08-08 PROCEDURE — 78306 BONE IMAGING WHOLE BODY: CPT

## 2022-08-08 PROCEDURE — 0 TECHNETIUM SESTAMIBI: Performed by: FAMILY MEDICINE

## 2022-08-08 RX ADMIN — TECHNETIUM TC 99M SESTAMIBI 1 DOSE: 1 INJECTION INTRAVENOUS at 09:44

## 2023-05-10 ENCOUNTER — OFFICE VISIT (OUTPATIENT)
Dept: CARDIOLOGY | Facility: CLINIC | Age: 68
End: 2023-05-10
Payer: MEDICARE

## 2023-05-10 VITALS
OXYGEN SATURATION: 99 % | DIASTOLIC BLOOD PRESSURE: 64 MMHG | HEART RATE: 78 BPM | HEIGHT: 66 IN | WEIGHT: 150.5 LBS | SYSTOLIC BLOOD PRESSURE: 108 MMHG | TEMPERATURE: 96.8 F | BODY MASS INDEX: 24.19 KG/M2

## 2023-05-10 DIAGNOSIS — R00.2 PALPITATIONS: ICD-10-CM

## 2023-05-10 DIAGNOSIS — I51.9 ASYMPTOMATIC LV DYSFUNCTION: ICD-10-CM

## 2023-05-10 DIAGNOSIS — I10 ESSENTIAL HYPERTENSION: Primary | ICD-10-CM

## 2023-05-10 DIAGNOSIS — I95.89 CHRONIC HYPOTENSION: ICD-10-CM

## 2023-05-10 LAB
QT INTERVAL: 398 MS
QTC INTERVAL: 453 MS

## 2023-05-10 RX ORDER — CALCIUM CARBONATE/VITAMIN D3 600 MG-10
1 TABLET ORAL DAILY
COMMUNITY

## 2023-05-10 RX ORDER — IBUPROFEN 200 MG
200 TABLET ORAL EVERY 6 HOURS PRN
COMMUNITY
End: 2023-05-10

## 2023-05-10 RX ORDER — TRAZODONE HYDROCHLORIDE 50 MG/1
50 TABLET ORAL
COMMUNITY

## 2023-05-10 RX ORDER — SENNOSIDES 8.6 MG
650 CAPSULE ORAL
COMMUNITY

## 2023-05-10 NOTE — PROGRESS NOTES
Lorin Fernandez  68 y.o. female    05/10/2023  1. Essential hypertension    2. Chronic hypotension    3. Palpitations    4. Asymptomatic LV dysfunction        History of Present Illness  Diagnoses 1 mentioned above is an error.    Lorin Fernandez is a 68-year-old female who is being seen by me for the first time, as referred by her primary care physician.  The patient unfortunately was not a good historian and was unable to give me the sequence of her medical history.  Her predominant complaint appears to be blood pressure running low and dizzy spells frequently related to this.  Her blood pressure has been as low as 70 systolic.  She indicates that her blood pressure has remained low for several years and she has gone off all medication that could potentially the blood pressure.  At one time she was on metoprolol succinate and this was discontinued.  She cannot remember dates and the physicians that she has seen in the past.    On review of available history, she has had history of synchronous bilateral breast cancer diagnosed in 2010 and is status post chemotherapy and I understand that she cannot tolerate hormonal therapy.  She has been on surveillance since 2012.  She has not followed up with oncology for more than 2 years.  I see a diagnosis of mitral valve prolapse mentioned in the chart but never really corroborated by echocardiogram.  She has seen Dr. Benito in [place] and on review of the records from there the following information was obtained.  Echocardiogram in June 2021 showed mildly reduced LV systolic function with an EF of 40 to 45%.  There was impaired left ventricular relaxation.  Trivial aortic valve insufficiency, mild mitral valve regurgitation, mild tricuspid valve regurgitation and mild pulmonary valve insufficiency was noted.  Nuclear stress test in June 2019 showed normal myocardial perfusion with no evidence of ischemia.    She denies any chest pain or shortness of breath at this  time and has not had any syncopal episodes.  She does have occasional fluttering in the chest.  No definite cardiac arrhythmias have been documented in the chart.    EKG today showed sinus rhythm with heart rate of 78 bpm.  No ST-T wave changes suggestive of ischemia.    SUBJECTIVE    No Known Allergies      Past Medical History:   Diagnosis Date   • Malignant neoplasm of breast    • Palpitations    • Parkinson's disease    • SOB (shortness of breath)          Past Surgical History:   Procedure Laterality Date   • ANAL FISSURECTOMY     • CHOLECYSTECTOMY     • COLONOSCOPY       as per patient - normal exam   • COLONOSCOPY N/A 2019    Procedure: COLONOSCOPY;  Surgeon: Francy Romano MD;  Location: [facility] ENDOSCOPY;  Service: Gastroenterology   • ENDOSCOPY N/A 2019    Procedure: ESOPHAGOGASTRODUODENOSCOPY;  Surgeon: Francy Romano MD;  Location: [facility] ENDOSCOPY;  Service: Gastroenterology   • HYSTERECTOMY     • MASTECTOMY     • TONSILLECTOMY AND ADENOIDECTOMY     • TUBAL ABDOMINAL LIGATION     • UPPER GASTROINTESTINAL ENDOSCOPY  2019         Family History   Problem Relation Age of Onset   • Breast cancer Mother    • Lung cancer Father    • Breast cancer Maternal Aunt    • Breast cancer Maternal Grandmother    • Breast cancer Paternal Grandmother          Social History     Socioeconomic History   • Marital status:    Tobacco Use   • Smoking status: Former     Packs/day: 0.50     Types: Cigarettes     Quit date:      Years since quittin.3   • Smokeless tobacco: Never   Substance and Sexual Activity   • Alcohol use: Yes     Comment: social   • Drug use: No   • Sexual activity: Defer         Current Outpatient Medications   Medication Sig Dispense Refill   • acetaminophen (TYLENOL) 650 MG 8 hr tablet Take 1 tablet by mouth.     • calcium carb-cholecalciferol 600-10 MG-MCG tablet per tablet Take 1 tablet by mouth Daily.     • carbidopa-levodopa (SINEMET)  MG per tablet  "Take 1 tablet by mouth 3 (Three) Times a Day.     • entacapone (COMTAN) 200 MG tablet Take 1 tablet by mouth 3 (Three) Times a Day.     • sertraline (ZOLOFT) 50 MG tablet      • traZODone (DESYREL) 50 MG tablet Take 1 tablet by mouth.       No current facility-administered medications for this visit.         OBJECTIVE    /64 (BP Location: Left arm, Patient Position: Sitting, Cuff Size: Adult)   Pulse 78   Temp 96.8 °F (36 °C)   Ht 167.6 cm (66\")   Wt 68.3 kg (150 lb 8 oz)   SpO2 99%   BMI 24.29 kg/m²         Review of Systems     Constitutional:  Denies recent weight loss, weight gain, fever or chills, no change in exercise tolerance     HENT:  Denies any hearing loss, epistaxis, hoarseness, or difficulty speaking.     Eyes: Wears eyeglasses or contact lenses     Respiratory:  Denies dyspnea with exertion, no cough, wheezing, or hemoptysis.     Cardiovascular: See HPI    Gastrointestinal:  Denies change in bowel habits, dyspepsia, ulcer disease, hematochezia, or melena.     Endocrine: Negative for cold intolerance, heat intolerance, polydipsia, polyphagia and polyuria.     Genitourinary: History of bilateral breast cancer, status post chemotherapy      Musculoskeletal: Denies any history of arthritic symptoms or back problems     Skin:  Denies any change in hair or nails, rashes, or skin lesions.     Allergic/Immunologic: Negative.  Negative for environmental allergies, food allergies and/or immunocompromised state.     Neurological: Parkinson's disease    Hematological: Denies any food allergies, seasonal allergies, bleeding disorders, or lymphadenopathy.     Psychiatric/Behavioral: Denies any history of depression, substance abuse, or change in cognitive function.       Physical Exam     Constitutional: Cooperative, alert and oriented, in no acute distress.     HENT:   Head: Normocephalic, normal hair patterns, no masses or tenderness.  Ears, Nose, and Throat: No gross abnormalities. No pallor or " cyanosis. Dentition good.   Eyes: EOMS intact, PERRL, conjunctivae and lids unremarkable. Fundoscopic exam and visual fields not performed.   Neck: No palpable masses or adenopathy, no thyromegaly, no JVD, carotid pulses are full and equal bilaterally and without bruit.     Cardiovascular: Regular rhythm, S1 and S2 normal, no S3 or S4.  No murmurs, gallops, or rubs detected.     Pulmonary/Chest: Chest: normal symmetry, normal respiratory excursion, no intercostal retraction, no use of accessory muscles.     Pulmonary: Normal breath sounds. No rales or rhonchi.    Abdominal: Abdomen soft, bowel sounds normoactive, no masses, no hepatosplenomegaly, nontender, no bruit.     Musculoskeletal: No deformities, clubbing, cyanosis, erythema, or edema observed.     Neurological: No gross motor or sensory deficits noted, affect appropriate, oriented to time, person, place.     Skin: Warm and dry to the touch, no apparent skin lesion or mass noted.     Psychiatric: She has a normal mood and affect. Her behavior is normal. Judgment and thought content normal.         Procedures      Lab Results   Component Value Date    WBC 6.2 07/12/2022    HGB 12.3 07/12/2022    HCT 38.2 07/12/2022    MCV 92.3 07/12/2022     07/12/2022     Lab Results   Component Value Date    GLUCOSE 103 (H) 10/03/2019    BUN 12 10/03/2019    CREATININE 0.78 10/03/2019    EGFRIFNONA 74 10/03/2019    EGFRIFAFRI 88 11/20/2018    BCR 15.4 10/03/2019    CO2 28.0 10/03/2019    CALCIUM 9.3 10/03/2019    ALBUMIN 4.70 10/03/2019    AST 9 10/03/2019    ALT <5 10/03/2019     No results found for: CHOL  Lab Results   Component Value Date    TRIG 69 01/15/2016     No results found for: HDL  No components found for: LDLCALC  Lab Results   Component Value Date     01/15/2016     01/15/2016     No results found for: HDLLDLRATIO  No components found for: CHOLHDL  No results found for: HGBA1C  Lab Results   Component Value Date    TSH 3.27 01/26/2023            ASSESSMENT AND PLAN  Lorin Fernandez is a 68-year-old female with history of breast cancer in the past status post chemotherapy, longstanding orthostatic hypotension, documented mild LV dysfunction in the past, Parkinson disease.  She does have chronically low blood pressure and have advised her to maintain a high fluid intake up to 60 to 70 ounces per day, being liberal with salt intake.  An echocardiogram to reassess left ventricular and valvular function is being arranged.  It appears that compliant with doctors appointments has been an issue in the past.  She had been on Zoloft 100 mg daily and she indicates that after she cut back to 50 mg daily her blood pressure is actually improved.  Her orthostasis could be related to many of the medicines for Parkinson's and depression/insomnia that she is on.  She carries a diagnosis of mitral valve prolapse but this is not well documented.  Her last echo does not mention it.  Suspicion for ischemia is low.  If her palpitations become an issue in the future, an event monitoring will be arranged.    Thank you for asked me to see this patient.    Diagnoses and all orders for this visit:    1. Essential hypertension (Primary)  -     ECG 12 Lead  -     Adult Transthoracic Echo Complete w/ Color, Spectral and Contrast if Necessary Per Protocol; Future    2. Chronic hypotension  -     Adult Transthoracic Echo Complete w/ Color, Spectral and Contrast if Necessary Per Protocol; Future    3. Palpitations  -     Adult Transthoracic Echo Complete w/ Color, Spectral and Contrast if Necessary Per Protocol; Future    4. Asymptomatic LV dysfunction  -     Adult Transthoracic Echo Complete w/ Color, Spectral and Contrast if Necessary Per Protocol; Future        BMI is within normal parameters. No other follow-up for BMI required.      Lorin Fernandez  reports that she quit smoking about 43 years ago. Her smoking use included cigarettes. She smoked an average of .5 packs  per day. She has never used smokeless tobacco.          Eduardo Bello MD  5/10/2023  10:05 CDT

## 2025-04-22 NOTE — PATIENT INSTRUCTIONS
Constipation, Adult  Constipation is when a person has fewer bowel movements in a week than normal, has difficulty having a bowel movement, or has stools that are dry, hard, or larger than normal. Constipation may be caused by an underlying condition. It may become worse with age if a person takes certain medicines and does not take in enough fluids.  Follow these instructions at home:  Eating and drinking    · Eat foods that have a lot of fiber, such as fresh fruits and vegetables, whole grains, and beans.  · Limit foods that are high in fat, low in fiber, or overly processed, such as french fries, hamburgers, cookies, candies, and soda.  · Drink enough fluid to keep your urine clear or pale yellow.  General instructions  · Exercise regularly or as told by your health care provider.  · Go to the restroom when you have the urge to go. Do not hold it in.  · Take over-the-counter and prescription medicines only as told by your health care provider. These include any fiber supplements.  · Practice pelvic floor retraining exercises, such as deep breathing while relaxing the lower abdomen and pelvic floor relaxation during bowel movements.  · Watch your condition for any changes.  · Keep all follow-up visits as told by your health care provider. This is important.  Contact a health care provider if:  · You have pain that gets worse.  · You have a fever.  · You do not have a bowel movement after 4 days.  · You vomit.  · You are not hungry.  · You lose weight.  · You are bleeding from the anus.  · You have thin, pencil-like stools.  Get help right away if:  · You have a fever and your symptoms suddenly get worse.  · You leak stool or have blood in your stool.  · Your abdomen is bloated.  · You have severe pain in your abdomen.  · You feel dizzy or you faint.  This information is not intended to replace advice given to you by your health care provider. Make sure you discuss any questions you have with your health care  Not sure how that happened.  I fixed it.    provider.  Document Released: 09/15/2005 Document Revised: 07/07/2017 Document Reviewed: 06/07/2017  [employer] Patient Education © 2019 [employer]

## (undated) DEVICE — SINGLE-USE BIOPSY FORCEPS: Brand: RADIAL JAW 4

## (undated) DEVICE — BITEBLOCK ENDO W/STRAP 60F A/ LF DISP

## (undated) DEVICE — CANN SMPL SOFTECH BIFLO ETCO2 A/M 7FT